# Patient Record
Sex: MALE | Race: ASIAN | NOT HISPANIC OR LATINO | URBAN - METROPOLITAN AREA
[De-identification: names, ages, dates, MRNs, and addresses within clinical notes are randomized per-mention and may not be internally consistent; named-entity substitution may affect disease eponyms.]

---

## 2017-01-10 ENCOUNTER — COMMUNICATION - HEALTHEAST (OUTPATIENT)
Dept: NURSING | Facility: CLINIC | Age: 22
End: 2017-01-10

## 2017-01-18 ENCOUNTER — COMMUNICATION - HEALTHEAST (OUTPATIENT)
Dept: NURSING | Facility: CLINIC | Age: 22
End: 2017-01-18

## 2017-02-06 ENCOUNTER — COMMUNICATION - HEALTHEAST (OUTPATIENT)
Dept: NURSING | Facility: CLINIC | Age: 22
End: 2017-02-06

## 2017-03-02 ENCOUNTER — COMMUNICATION - HEALTHEAST (OUTPATIENT)
Dept: NURSING | Facility: CLINIC | Age: 22
End: 2017-03-02

## 2017-03-03 ENCOUNTER — AMBULATORY - HEALTHEAST (OUTPATIENT)
Dept: CARE COORDINATION | Facility: CLINIC | Age: 22
End: 2017-03-03

## 2017-03-30 ENCOUNTER — COMMUNICATION - HEALTHEAST (OUTPATIENT)
Dept: NURSING | Facility: CLINIC | Age: 22
End: 2017-03-30

## 2017-04-28 ENCOUNTER — COMMUNICATION - HEALTHEAST (OUTPATIENT)
Dept: NURSING | Facility: CLINIC | Age: 22
End: 2017-04-28

## 2017-05-08 ENCOUNTER — COMMUNICATION - HEALTHEAST (OUTPATIENT)
Dept: NURSING | Facility: CLINIC | Age: 22
End: 2017-05-08

## 2017-05-17 ENCOUNTER — OFFICE VISIT - HEALTHEAST (OUTPATIENT)
Dept: FAMILY MEDICINE | Facility: CLINIC | Age: 22
End: 2017-05-17

## 2017-05-17 DIAGNOSIS — M25.561 ACUTE PAIN OF BOTH KNEES: ICD-10-CM

## 2017-05-17 DIAGNOSIS — G89.29 CHRONIC MIDLINE LOW BACK PAIN WITHOUT SCIATICA: ICD-10-CM

## 2017-05-17 DIAGNOSIS — M54.50 CHRONIC MIDLINE LOW BACK PAIN WITHOUT SCIATICA: ICD-10-CM

## 2017-05-17 DIAGNOSIS — G47.00 INSOMNIA, UNSPECIFIED: ICD-10-CM

## 2017-05-17 DIAGNOSIS — M25.562 ACUTE PAIN OF BOTH KNEES: ICD-10-CM

## 2017-05-30 ENCOUNTER — COMMUNICATION - HEALTHEAST (OUTPATIENT)
Dept: CARE COORDINATION | Facility: CLINIC | Age: 22
End: 2017-05-30

## 2017-06-15 ENCOUNTER — COMMUNICATION - HEALTHEAST (OUTPATIENT)
Dept: NURSING | Facility: CLINIC | Age: 22
End: 2017-06-15

## 2017-07-20 ENCOUNTER — COMMUNICATION - HEALTHEAST (OUTPATIENT)
Dept: FAMILY MEDICINE | Facility: CLINIC | Age: 22
End: 2017-07-20

## 2017-08-04 ENCOUNTER — COMMUNICATION - HEALTHEAST (OUTPATIENT)
Dept: NURSING | Facility: CLINIC | Age: 22
End: 2017-08-04

## 2017-08-07 ENCOUNTER — COMMUNICATION - HEALTHEAST (OUTPATIENT)
Dept: NURSING | Facility: CLINIC | Age: 22
End: 2017-08-07

## 2017-08-09 ENCOUNTER — COMMUNICATION - HEALTHEAST (OUTPATIENT)
Dept: NURSING | Facility: CLINIC | Age: 22
End: 2017-08-09

## 2017-08-16 ENCOUNTER — COMMUNICATION - HEALTHEAST (OUTPATIENT)
Dept: CARE COORDINATION | Facility: CLINIC | Age: 22
End: 2017-08-16

## 2017-08-16 ENCOUNTER — COMMUNICATION - HEALTHEAST (OUTPATIENT)
Dept: NURSING | Facility: CLINIC | Age: 22
End: 2017-08-16

## 2017-10-03 ENCOUNTER — COMMUNICATION - HEALTHEAST (OUTPATIENT)
Dept: NURSING | Facility: CLINIC | Age: 22
End: 2017-10-03

## 2017-10-18 ENCOUNTER — COMMUNICATION - HEALTHEAST (OUTPATIENT)
Dept: FAMILY MEDICINE | Facility: CLINIC | Age: 22
End: 2017-10-18

## 2017-12-26 ENCOUNTER — COMMUNICATION - HEALTHEAST (OUTPATIENT)
Dept: NURSING | Facility: CLINIC | Age: 22
End: 2017-12-26

## 2018-01-03 ENCOUNTER — OFFICE VISIT - HEALTHEAST (OUTPATIENT)
Dept: FAMILY MEDICINE | Facility: CLINIC | Age: 23
End: 2018-01-03

## 2018-01-03 DIAGNOSIS — M25.562 ACUTE PAIN OF BOTH KNEES: ICD-10-CM

## 2018-01-03 DIAGNOSIS — R63.0 DECREASED APPETITE: ICD-10-CM

## 2018-01-03 DIAGNOSIS — J06.9 ACUTE URI: ICD-10-CM

## 2018-01-03 DIAGNOSIS — Z23 NEED FOR INFLUENZA VACCINATION: ICD-10-CM

## 2018-01-03 DIAGNOSIS — Z88.9 MULTIPLE ALLERGIES: ICD-10-CM

## 2018-01-03 DIAGNOSIS — G47.00 INSOMNIA: ICD-10-CM

## 2018-01-03 DIAGNOSIS — M25.561 ACUTE PAIN OF BOTH KNEES: ICD-10-CM

## 2018-01-03 ASSESSMENT — MIFFLIN-ST. JEOR: SCORE: 1333.36

## 2018-02-12 ENCOUNTER — COMMUNICATION - HEALTHEAST (OUTPATIENT)
Dept: NURSING | Facility: CLINIC | Age: 23
End: 2018-02-12

## 2018-03-06 ENCOUNTER — COMMUNICATION - HEALTHEAST (OUTPATIENT)
Dept: NURSING | Facility: CLINIC | Age: 23
End: 2018-03-06

## 2018-03-21 ENCOUNTER — COMMUNICATION - HEALTHEAST (OUTPATIENT)
Dept: NURSING | Facility: CLINIC | Age: 23
End: 2018-03-21

## 2018-03-26 ENCOUNTER — COMMUNICATION - HEALTHEAST (OUTPATIENT)
Dept: NURSING | Facility: CLINIC | Age: 23
End: 2018-03-26

## 2018-04-25 ENCOUNTER — COMMUNICATION - HEALTHEAST (OUTPATIENT)
Dept: NURSING | Facility: CLINIC | Age: 23
End: 2018-04-25

## 2018-05-07 ENCOUNTER — OFFICE VISIT - HEALTHEAST (OUTPATIENT)
Dept: FAMILY MEDICINE | Facility: CLINIC | Age: 23
End: 2018-05-07

## 2018-05-07 DIAGNOSIS — H91.90 HEARING LOSS: ICD-10-CM

## 2018-05-07 DIAGNOSIS — F73 PROFOUND INTELLECTUAL DISABILITIES: ICD-10-CM

## 2018-05-07 DIAGNOSIS — Z00.00 WELL ADULT EXAM: ICD-10-CM

## 2018-05-07 DIAGNOSIS — R76.11 NONSPECIFIC REACTION TO TUBERCULIN SKIN TEST WITHOUT ACTIVE TUBERCULOSIS: ICD-10-CM

## 2018-05-07 DIAGNOSIS — F84.9 PERVASIVE DEVELOPMENTAL DISORDER: ICD-10-CM

## 2018-05-07 DIAGNOSIS — Z22.7 LATENT TUBERCULOSIS INFECTION: ICD-10-CM

## 2018-05-07 LAB
ALBUMIN SERPL-MCNC: 3.8 G/DL (ref 3.5–5)
ALP SERPL-CCNC: 69 U/L (ref 45–120)
ALT SERPL W P-5'-P-CCNC: 20 U/L (ref 0–45)
ANION GAP SERPL CALCULATED.3IONS-SCNC: 11 MMOL/L (ref 5–18)
AST SERPL W P-5'-P-CCNC: 35 U/L (ref 0–40)
BILIRUB SERPL-MCNC: 0.3 MG/DL (ref 0–1)
BUN SERPL-MCNC: 13 MG/DL (ref 8–22)
CALCIUM SERPL-MCNC: 9.4 MG/DL (ref 8.5–10.5)
CHLORIDE BLD-SCNC: 102 MMOL/L (ref 98–107)
CO2 SERPL-SCNC: 25 MMOL/L (ref 22–31)
CREAT SERPL-MCNC: 0.99 MG/DL (ref 0.7–1.3)
ERYTHROCYTE [DISTWIDTH] IN BLOOD BY AUTOMATED COUNT: 11.7 % (ref 11–14.5)
GFR SERPL CREATININE-BSD FRML MDRD: >60 ML/MIN/1.73M2
GLUCOSE BLD-MCNC: 73 MG/DL (ref 70–125)
HCT VFR BLD AUTO: 41.3 % (ref 40–54)
HGB BLD-MCNC: 13.4 G/DL (ref 14–18)
MCH RBC QN AUTO: 30.6 PG (ref 27–34)
MCHC RBC AUTO-ENTMCNC: 32.5 G/DL (ref 32–36)
MCV RBC AUTO: 94 FL (ref 80–100)
PLATELET # BLD AUTO: 265 THOU/UL (ref 140–440)
PMV BLD AUTO: 8.6 FL (ref 7–10)
POTASSIUM BLD-SCNC: 4.2 MMOL/L (ref 3.5–5)
PROT SERPL-MCNC: 7.2 G/DL (ref 6–8)
RBC # BLD AUTO: 4.38 MILL/UL (ref 4.4–6.2)
SODIUM SERPL-SCNC: 138 MMOL/L (ref 136–145)
TSH SERPL DL<=0.005 MIU/L-ACNC: 0.8 UIU/ML (ref 0.3–5)
WBC: 4.3 THOU/UL (ref 4–11)

## 2018-05-07 ASSESSMENT — MIFFLIN-ST. JEOR: SCORE: 1328.83

## 2018-05-14 ENCOUNTER — COMMUNICATION - HEALTHEAST (OUTPATIENT)
Dept: FAMILY MEDICINE | Facility: CLINIC | Age: 23
End: 2018-05-14

## 2018-05-14 DIAGNOSIS — D64.9 ANEMIA: ICD-10-CM

## 2018-05-15 ENCOUNTER — AMBULATORY - HEALTHEAST (OUTPATIENT)
Dept: LAB | Facility: CLINIC | Age: 23
End: 2018-05-15

## 2018-05-15 ENCOUNTER — COMMUNICATION - HEALTHEAST (OUTPATIENT)
Dept: TELEHEALTH | Facility: CLINIC | Age: 23
End: 2018-05-15

## 2018-05-15 DIAGNOSIS — D64.9 ANEMIA: ICD-10-CM

## 2018-05-15 LAB
FERRITIN SERPL-MCNC: 84 NG/ML (ref 27–300)
IRON SATN MFR SERPL: 31 % (ref 20–50)
IRON SERPL-MCNC: 125 UG/DL (ref 42–175)
TIBC SERPL-MCNC: 408 UG/DL (ref 313–563)
TRANSFERRIN SERPL-MCNC: 326 MG/DL (ref 212–360)

## 2018-05-17 LAB
HEMOGLOBIN A2 QUANTITATION: 3.2 % (ref 2.2–3.5)
HEMOGLOBIN ELECTROPHRESIS: NORMAL
HEMOGLOBIN F QUANTITATION: <0.8 % (ref 0–2)
PATH ICD:: NORMAL
REVIEWING PATHOLOGIST: NORMAL

## 2018-05-22 ENCOUNTER — OFFICE VISIT - HEALTHEAST (OUTPATIENT)
Dept: AUDIOLOGY | Facility: CLINIC | Age: 23
End: 2018-05-22

## 2018-05-22 DIAGNOSIS — H90.A21 SENSORINEURAL HEARING LOSS (SNHL) OF RIGHT EAR WITH RESTRICTED HEARING OF LEFT EAR: ICD-10-CM

## 2018-06-13 ENCOUNTER — COMMUNICATION - HEALTHEAST (OUTPATIENT)
Dept: NURSING | Facility: CLINIC | Age: 23
End: 2018-06-13

## 2018-06-14 ENCOUNTER — COMMUNICATION - HEALTHEAST (OUTPATIENT)
Dept: FAMILY MEDICINE | Facility: CLINIC | Age: 23
End: 2018-06-14

## 2018-06-14 DIAGNOSIS — M25.562 ACUTE PAIN OF BOTH KNEES: ICD-10-CM

## 2018-06-14 DIAGNOSIS — M25.561 ACUTE PAIN OF BOTH KNEES: ICD-10-CM

## 2018-06-20 ENCOUNTER — COMMUNICATION - HEALTHEAST (OUTPATIENT)
Dept: NURSING | Facility: CLINIC | Age: 23
End: 2018-06-20

## 2018-07-03 ENCOUNTER — COMMUNICATION - HEALTHEAST (OUTPATIENT)
Dept: NURSING | Facility: CLINIC | Age: 23
End: 2018-07-03

## 2018-08-03 ENCOUNTER — COMMUNICATION - HEALTHEAST (OUTPATIENT)
Dept: NURSING | Facility: CLINIC | Age: 23
End: 2018-08-03

## 2018-09-20 ENCOUNTER — COMMUNICATION - HEALTHEAST (OUTPATIENT)
Dept: NURSING | Facility: CLINIC | Age: 23
End: 2018-09-20

## 2018-10-09 ENCOUNTER — COMMUNICATION - HEALTHEAST (OUTPATIENT)
Dept: NURSING | Facility: CLINIC | Age: 23
End: 2018-10-09

## 2018-10-12 ENCOUNTER — AMBULATORY - HEALTHEAST (OUTPATIENT)
Dept: CARE COORDINATION | Facility: CLINIC | Age: 23
End: 2018-10-12

## 2018-10-25 ENCOUNTER — COMMUNICATION - HEALTHEAST (OUTPATIENT)
Dept: NURSING | Facility: CLINIC | Age: 23
End: 2018-10-25

## 2018-11-01 ENCOUNTER — COMMUNICATION - HEALTHEAST (OUTPATIENT)
Dept: FAMILY MEDICINE | Facility: CLINIC | Age: 23
End: 2018-11-01

## 2018-11-01 DIAGNOSIS — M25.562 ACUTE PAIN OF BOTH KNEES: ICD-10-CM

## 2018-11-01 DIAGNOSIS — M25.561 ACUTE PAIN OF BOTH KNEES: ICD-10-CM

## 2019-01-03 ENCOUNTER — HOSPITAL ENCOUNTER (OUTPATIENT)
Dept: LAB | Age: 24
Setting detail: SPECIMEN
Discharge: HOME | End: 2019-01-03

## 2019-01-03 ENCOUNTER — OFFICE VISIT - HEALTHEAST (OUTPATIENT)
Dept: FAMILY MEDICINE | Facility: CLINIC | Age: 24
End: 2019-01-03

## 2019-01-03 DIAGNOSIS — R04.0 RECURRENT EPISTAXIS: ICD-10-CM

## 2019-01-03 DIAGNOSIS — M25.561 ACUTE PAIN OF BOTH KNEES: ICD-10-CM

## 2019-01-03 DIAGNOSIS — M25.562 ACUTE PAIN OF BOTH KNEES: ICD-10-CM

## 2019-01-03 DIAGNOSIS — R63.0 DECREASED APPETITE: ICD-10-CM

## 2019-01-03 DIAGNOSIS — G47.00 INSOMNIA: ICD-10-CM

## 2019-01-03 LAB
ANION GAP SERPL CALCULATED.3IONS-SCNC: 12 MMOL/L (ref 5–18)
APTT PPP: 34 SECONDS (ref 24–37)
BUN SERPL-MCNC: 9 MG/DL (ref 8–22)
CALCIUM SERPL-MCNC: 9.9 MG/DL (ref 8.5–10.5)
CHLORIDE BLD-SCNC: 100 MMOL/L (ref 98–107)
CO2 SERPL-SCNC: 26 MMOL/L (ref 22–31)
CREAT SERPL-MCNC: 0.88 MG/DL (ref 0.7–1.3)
ERYTHROCYTE [DISTWIDTH] IN BLOOD BY AUTOMATED COUNT: 12.6 % (ref 11–14.5)
GFR SERPL CREATININE-BSD FRML MDRD: >60 ML/MIN/1.73M2
GLUCOSE BLD-MCNC: 77 MG/DL (ref 70–125)
HCT VFR BLD AUTO: 44.4 % (ref 40–54)
HGB BLD-MCNC: 14.4 G/DL (ref 14–18)
INR PPP: 0.96 (ref 0.9–1.1)
MCH RBC QN AUTO: 29.6 PG (ref 27–34)
MCHC RBC AUTO-ENTMCNC: 32.5 G/DL (ref 32–36)
MCV RBC AUTO: 91 FL (ref 80–100)
PLATELET # BLD AUTO: 291 THOU/UL (ref 140–440)
PMV BLD AUTO: 8.6 FL (ref 7–10)
POTASSIUM BLD-SCNC: 4.6 MMOL/L (ref 3.5–5)
RBC # BLD AUTO: 4.88 MILL/UL (ref 4.4–6.2)
SODIUM SERPL-SCNC: 138 MMOL/L (ref 136–145)
WBC: 3.5 THOU/UL (ref 4–11)

## 2019-01-04 ENCOUNTER — COMMUNICATION - HEALTHEAST (OUTPATIENT)
Dept: NURSING | Facility: CLINIC | Age: 24
End: 2019-01-04

## 2019-01-04 ENCOUNTER — COMMUNICATION - HEALTHEAST (OUTPATIENT)
Dept: CARE COORDINATION | Facility: CLINIC | Age: 24
End: 2019-01-04

## 2019-01-28 ENCOUNTER — COMMUNICATION - HEALTHEAST (OUTPATIENT)
Dept: NURSING | Facility: CLINIC | Age: 24
End: 2019-01-28

## 2019-03-18 ENCOUNTER — COMMUNICATION - HEALTHEAST (OUTPATIENT)
Dept: NURSING | Facility: CLINIC | Age: 24
End: 2019-03-18

## 2019-03-25 ENCOUNTER — OFFICE VISIT - HEALTHEAST (OUTPATIENT)
Dept: FAMILY MEDICINE | Facility: CLINIC | Age: 24
End: 2019-03-25

## 2019-03-25 DIAGNOSIS — R63.0 DECREASED APPETITE: ICD-10-CM

## 2019-03-25 DIAGNOSIS — G47.00 INSOMNIA: ICD-10-CM

## 2019-03-25 ASSESSMENT — MIFFLIN-ST. JEOR: SCORE: 1298.21

## 2019-04-19 ENCOUNTER — AMBULATORY - HEALTHEAST (OUTPATIENT)
Dept: NURSING | Facility: CLINIC | Age: 24
End: 2019-04-19

## 2019-05-20 ENCOUNTER — COMMUNICATION - HEALTHEAST (OUTPATIENT)
Dept: NURSING | Facility: CLINIC | Age: 24
End: 2019-05-20

## 2019-05-30 ENCOUNTER — COMMUNICATION - HEALTHEAST (OUTPATIENT)
Dept: NURSING | Facility: CLINIC | Age: 24
End: 2019-05-30

## 2019-06-05 ENCOUNTER — COMMUNICATION - HEALTHEAST (OUTPATIENT)
Dept: NURSING | Facility: CLINIC | Age: 24
End: 2019-06-05

## 2019-07-11 ENCOUNTER — COMMUNICATION - HEALTHEAST (OUTPATIENT)
Dept: NURSING | Facility: CLINIC | Age: 24
End: 2019-07-11

## 2019-07-16 ENCOUNTER — COMMUNICATION - HEALTHEAST (OUTPATIENT)
Dept: NURSING | Facility: CLINIC | Age: 24
End: 2019-07-16

## 2019-08-16 ENCOUNTER — COMMUNICATION - HEALTHEAST (OUTPATIENT)
Dept: NURSING | Facility: CLINIC | Age: 24
End: 2019-08-16

## 2019-09-06 ENCOUNTER — COMMUNICATION - HEALTHEAST (OUTPATIENT)
Dept: NURSING | Facility: CLINIC | Age: 24
End: 2019-09-06

## 2019-09-06 ENCOUNTER — AMBULATORY - HEALTHEAST (OUTPATIENT)
Dept: NURSING | Facility: CLINIC | Age: 24
End: 2019-09-06

## 2019-09-06 ENCOUNTER — COMMUNICATION - HEALTHEAST (OUTPATIENT)
Dept: TELEHEALTH | Facility: CLINIC | Age: 24
End: 2019-09-06

## 2019-09-06 ASSESSMENT — ACTIVITIES OF DAILY LIVING (ADL)
DEPENDENT_IADLS:: CLEANING;COOKING;LAUNDRY;SHOPPING;MEAL PREPARATION;MEDICATION MANAGEMENT;MONEY MANAGEMENT;TRANSPORTATION

## 2019-09-09 ENCOUNTER — COMMUNICATION - HEALTHEAST (OUTPATIENT)
Dept: FAMILY MEDICINE | Facility: CLINIC | Age: 24
End: 2019-09-09

## 2019-09-09 DIAGNOSIS — G47.00 INSOMNIA: ICD-10-CM

## 2019-09-09 DIAGNOSIS — R63.0 DECREASED APPETITE: ICD-10-CM

## 2019-10-07 ENCOUNTER — COMMUNICATION - HEALTHEAST (OUTPATIENT)
Dept: NURSING | Facility: CLINIC | Age: 24
End: 2019-10-07

## 2019-10-17 ENCOUNTER — COMMUNICATION - HEALTHEAST (OUTPATIENT)
Dept: NURSING | Facility: CLINIC | Age: 24
End: 2019-10-17

## 2019-11-20 ENCOUNTER — COMMUNICATION - HEALTHEAST (OUTPATIENT)
Dept: NURSING | Facility: CLINIC | Age: 24
End: 2019-11-20

## 2019-12-10 ENCOUNTER — COMMUNICATION - HEALTHEAST (OUTPATIENT)
Dept: FAMILY MEDICINE | Facility: CLINIC | Age: 24
End: 2019-12-10

## 2020-04-21 ENCOUNTER — COMMUNICATION - HEALTHEAST (OUTPATIENT)
Dept: NURSING | Facility: CLINIC | Age: 25
End: 2020-04-21

## 2020-05-01 ENCOUNTER — OFFICE VISIT - HEALTHEAST (OUTPATIENT)
Dept: FAMILY MEDICINE | Facility: CLINIC | Age: 25
End: 2020-05-01

## 2020-05-01 DIAGNOSIS — M25.561 ACUTE PAIN OF BOTH KNEES: ICD-10-CM

## 2020-05-01 DIAGNOSIS — M25.562 ACUTE PAIN OF BOTH KNEES: ICD-10-CM

## 2020-05-01 DIAGNOSIS — R63.0 DECREASED APPETITE: ICD-10-CM

## 2020-05-01 DIAGNOSIS — G47.00 INSOMNIA: ICD-10-CM

## 2020-05-01 RX ORDER — IBUPROFEN 200 MG
200-400 TABLET ORAL EVERY 6 HOURS PRN
Qty: 100 TABLET | Refills: 5 | Status: SHIPPED | OUTPATIENT
Start: 2020-05-01 | End: 2021-09-27

## 2020-05-01 RX ORDER — CYPROHEPTADINE HYDROCHLORIDE 4 MG/1
4 TABLET ORAL DAILY
Qty: 90 TABLET | Refills: 2 | Status: SHIPPED | OUTPATIENT
Start: 2020-05-01 | End: 2021-09-27

## 2020-05-01 RX ORDER — MIRTAZAPINE 30 MG/1
30 TABLET, FILM COATED ORAL AT BEDTIME
Qty: 90 TABLET | Refills: 2 | Status: SHIPPED | OUTPATIENT
Start: 2020-05-01 | End: 2022-05-26

## 2020-05-01 ASSESSMENT — PATIENT HEALTH QUESTIONNAIRE - PHQ9: SUM OF ALL RESPONSES TO PHQ QUESTIONS 1-9: 0

## 2020-11-06 ENCOUNTER — AMBULATORY - HEALTHEAST (OUTPATIENT)
Dept: CARE COORDINATION | Facility: CLINIC | Age: 25
End: 2020-11-06

## 2020-11-06 ENCOUNTER — COMMUNICATION - HEALTHEAST (OUTPATIENT)
Dept: NURSING | Facility: CLINIC | Age: 25
End: 2020-11-06

## 2020-11-06 DIAGNOSIS — Z65.9 PSYCHOSOCIAL PROBLEM: ICD-10-CM

## 2020-11-20 ENCOUNTER — COMMUNICATION - HEALTHEAST (OUTPATIENT)
Dept: CARE COORDINATION | Facility: CLINIC | Age: 25
End: 2020-11-20

## 2020-11-25 ENCOUNTER — COMMUNICATION - HEALTHEAST (OUTPATIENT)
Dept: NURSING | Facility: CLINIC | Age: 25
End: 2020-11-25

## 2020-12-02 ENCOUNTER — COMMUNICATION - HEALTHEAST (OUTPATIENT)
Dept: NURSING | Facility: CLINIC | Age: 25
End: 2020-12-02

## 2020-12-02 ASSESSMENT — ACTIVITIES OF DAILY LIVING (ADL): DEPENDENT_IADLS:: CLEANING;COOKING;LAUNDRY;SHOPPING;MEAL PREPARATION;TRANSPORTATION

## 2020-12-22 ENCOUNTER — COMMUNICATION - HEALTHEAST (OUTPATIENT)
Dept: CARE COORDINATION | Facility: CLINIC | Age: 25
End: 2020-12-22

## 2020-12-30 ENCOUNTER — AMBULATORY - HEALTHEAST (OUTPATIENT)
Dept: NURSING | Facility: CLINIC | Age: 25
End: 2020-12-30

## 2020-12-30 ENCOUNTER — OFFICE VISIT - HEALTHEAST (OUTPATIENT)
Dept: FAMILY MEDICINE | Facility: CLINIC | Age: 25
End: 2020-12-30

## 2020-12-30 DIAGNOSIS — F84.9 PERVASIVE DEVELOPMENTAL DISORDER: ICD-10-CM

## 2020-12-30 DIAGNOSIS — F73 PROFOUND INTELLECTUAL DISABILITIES: ICD-10-CM

## 2020-12-30 ASSESSMENT — MIFFLIN-ST. JEOR: SCORE: 1415.58

## 2021-01-07 ENCOUNTER — COMMUNICATION - HEALTHEAST (OUTPATIENT)
Dept: NURSING | Facility: CLINIC | Age: 26
End: 2021-01-07

## 2021-01-15 ENCOUNTER — COMMUNICATION - HEALTHEAST (OUTPATIENT)
Dept: NURSING | Facility: CLINIC | Age: 26
End: 2021-01-15

## 2021-02-08 ENCOUNTER — AMBULATORY - HEALTHEAST (OUTPATIENT)
Dept: NURSING | Facility: CLINIC | Age: 26
End: 2021-02-08

## 2021-02-24 ENCOUNTER — OFFICE VISIT - HEALTHEAST (OUTPATIENT)
Dept: OTOLARYNGOLOGY | Facility: CLINIC | Age: 26
End: 2021-02-24

## 2021-02-24 ENCOUNTER — COMMUNICATION - HEALTHEAST (OUTPATIENT)
Dept: NURSING | Facility: CLINIC | Age: 26
End: 2021-02-24

## 2021-02-24 DIAGNOSIS — H60.8X3 CHRONIC ECZEMATOID OTITIS EXTERNA OF BOTH EARS: ICD-10-CM

## 2021-03-08 ENCOUNTER — COMMUNICATION - HEALTHEAST (OUTPATIENT)
Dept: CARE COORDINATION | Facility: CLINIC | Age: 26
End: 2021-03-08

## 2021-03-22 ENCOUNTER — COMMUNICATION - HEALTHEAST (OUTPATIENT)
Dept: CARE COORDINATION | Facility: CLINIC | Age: 26
End: 2021-03-22

## 2021-03-25 ENCOUNTER — COMMUNICATION - HEALTHEAST (OUTPATIENT)
Dept: NURSING | Facility: CLINIC | Age: 26
End: 2021-03-25

## 2021-04-05 ENCOUNTER — COMMUNICATION - HEALTHEAST (OUTPATIENT)
Dept: NURSING | Facility: CLINIC | Age: 26
End: 2021-04-05

## 2021-04-07 ENCOUNTER — COMMUNICATION - HEALTHEAST (OUTPATIENT)
Dept: NURSING | Facility: CLINIC | Age: 26
End: 2021-04-07

## 2021-04-22 ENCOUNTER — COMMUNICATION - HEALTHEAST (OUTPATIENT)
Dept: CARE COORDINATION | Facility: CLINIC | Age: 26
End: 2021-04-22

## 2021-05-07 ENCOUNTER — COMMUNICATION - HEALTHEAST (OUTPATIENT)
Dept: NURSING | Facility: CLINIC | Age: 26
End: 2021-05-07

## 2021-05-27 ASSESSMENT — PATIENT HEALTH QUESTIONNAIRE - PHQ9: SUM OF ALL RESPONSES TO PHQ QUESTIONS 1-9: 0

## 2021-05-27 NOTE — PROGRESS NOTES
"Subjective:  23 y.o. male with concerns of follow-up and needing medication refills.  He has a developmental delay.  Family (brother and sister) have consistently reported that if he does not have a medicine that helps him sleep he is up wandering and frequently go outside and wandering the neighborhood.  Also, he does not eat very much.  Remeron and Cipro hepatoid Davide have been helpful in increasing diet.  He is not medications for about a month now and that causes difficulty with appetite.    Distantly, he has a letter today that says his Social Security income payments will be stopping as of March 2019.  This does not say why the stop which has occurred.  It was several reasons why it might.  He has a visit scheduled with a  here next month.    Outpatient Medications Prior to Visit   Medication Sig Dispense Refill     ibuprofen (MOTRIN IB) 200 MG tablet Take 1-2 tablets (200-400 mg total) by mouth every 6 (six) hours as needed for pain, fever or headaches. 100 tablet 5     sodium chloride (OCEAN) 0.65 % nasal spray 2 sprays into each nostril 2 (two) times a day. 15 mL 12     cyproheptadine (PERIACTIN) 4 mg tablet Take 1 tablet (4 mg total) by mouth daily. 90 tablet 1     mirtazapine (REMERON) 30 MG tablet Take 1 tablet (30 mg total) by mouth at bedtime. 90 tablet 1     No facility-administered medications prior to visit.       Social History     Tobacco Use   Smoking Status Never Smoker   Smokeless Tobacco Current User     Types: Chew      Objective:  BP 98/62 (Patient Site: Right Arm, Patient Position: Sitting, Cuff Size: Adult Small)   Pulse 98   Temp 99.4  F (37.4  C) (Oral)   Resp 20   Ht 5' 2.75\" (1.594 m)   Wt (!) 95 lb 4 oz (43.2 kg)   BMI 17.01 kg/m    GENERAL: alert, not distressed  CHEST: clear, no rales, rhonchi, or wheezes  CARDIAC: regular without murmur, gallop, or rub  ABDOMEN: soft, non tender, non distended, normal bowel sounds    Assessment and Plan:   1. Decreased " appetite  Was better on med, worse now that off.  No side effects noted.  Will continue  - cyproheptadine (PERIACTIN) 4 mg tablet; Take 1 tablet (4 mg total) by mouth daily.  Dispense: 90 tablet; Refill: 1    2. Insomnia  Better per brother with med.  No side effects.  Worse when off me.  Will renew  - mirtazapine (REMERON) 30 MG tablet; Take 1 tablet (30 mg total) by mouth at bedtime.  Dispense: 90 tablet; Refill: 1     Advised to keep follow-up appointment with .  Advised that he could go to Social Security office right now to address concerns in the letter.  Brother expresses that he intends to do this.

## 2021-05-28 NOTE — PROGRESS NOTES
Assessment    The pt, 2 family members, , ANUPAMA Agarwal and ANUPAMA Gutierrez present for meeting with pt to discuss citizenship. SW assisted with calling [facility] and completed intake, worker informed pt that pt s case will be passed on to the immigration law unit who will be following up with the pt within the next 4-6 weeks to discuss the pt s case further.    Pt s family reported his SSI had stopped and he hasn t received any SSI for a month now. Family showed SW the letter they received, however, letter was a notice saying SSI would stop after April, 2021 if pt does not obtain citizenship by then--no other letters at this time per family regarding SSI stopping now. Pt s family will try calling the SSA to learn more, will let CCC know if they struggle with this.    Pt has community support services via DD Waiver and family support. Pt needing minimal assist from CCC at this time.      Action Plan:    will:  1) Available as needed       Care Guide will:  Care Guide Delegation:   1)  Due Date:  None at this time       Delegation:      Subjective     The pt, 2 family members and  present for meeting with SW to discuss citizenship. SW assisted with completing intake with [facility] for assist with applying for citizenship--pt is eligible to apply beginning April, 2019.     SSI has been inactive for a month, only letter they had received from [facility] was a letter stating the pt will need to obtain citizenship by April, 2021 and if he does not, his SSI will end. SW advised pt and family to call the SSA or go into the local office to find out why benefits had been cut off at this time. SW informed the pt and family that if they struggle to reach the SSA to learn more, let CCC know.      Objective      Appearance: Casually and appropriately dressed, well-groomed, normal gait.      Behavior: Avoidant      Speech: Pt did not speak; family spoke on his behalf.      Emotion/Affect: Quiet      Thought Processes: not  assessed       Orientation: not assessed      Memory: not assessed       General Intellectual Abilities:  not assessed      Judgment: not assessed       Insight: not assessed     Clinical Recommendations: The pt has a good support system through family and community support services. Pt needing minimal assist at this time from CCC.    Goal updated:    Goals        Patient Stated      I would like to connect with a legal agency to help me apply for citizenship within the next 2 months. (pt-stated)      Action steps to achieve this goal  1.  I have completed intake with [facility] on 4/19/19 for assist with citizenship.  2. I will follow up with [facility] immigration law unit when they call me to discuss my case further and notify me if they will be taking my case.  3. I will update my care guide when I know if [facility] has established legal services to assist me.    Updated: 4/19/19  Date goal set:  1/28/19

## 2021-05-29 NOTE — PROGRESS NOTES
: Massiel ID#: [id number] Agency: [employer]    Scheduled Follow Up Call: Attempt 1  Care Guide called and left a message for the patient.  If the patient is returning my call, please transfer them to me, Hamilton Parks, at [phone].    Upcoming Appointments:  None     Next Outreach: 5/29/19  _____________________________  Planned Outreach Frequency: every 6 weeks  Preferred Phone Number: [phone]  Main /Legal Guardian: Axel Pop (brother)    Legal Immigration:  Born: [facility]  Date Came to [place]: [birth year]  Green Card:  Resident: 4/29/14  Expires: 3/17/26  Will need to begin citizenship process on or before 4/29/19  Dr. Dent will complete the N-648 form     Social Support:  Legal Guardian/Brother: Axel Gayathri Torrez  Sister: Eddie Alvarado

## 2021-05-29 NOTE — PROGRESS NOTES
: [facility] ID#: [id number] Agency: [employer]    Scheduled Follow Up Call Attempt 3:   Care Guide called patient.  If this patient is returning my call, please transfer to Hamilton Parks at [phone].  I have called this patient 3 times over the past two weeks and have been unsuccessful in reaching this patient.  This patient has also not returned any of my messages.  I will continue attempting to reach out to this patient in one month.  I will also check this patient's chart for upcoming appointments, ER reports that may contain a new phone number, or any other recent activity.    Email from Ruth Luz ([facility]):  Matt Villasenor,    I understand you are helping my client Eliseo Lfoton, 1995     I spoke with the client's brother and the brother said he would get me documents proving that he is the legal guardian (not just power of ).  Further, he said he would get me the front and back of his brother's green card.   Last, I did send some screening documents and questionnaire for client to fill it.  If you can, please check in with the client/brother to make sure these tasks are fulfilled.    Also, do you know if the client is getting a medical waiver?    Upcoming Appointments:  None     Next Outreach: 7/10/19  _____________________________  Planned Outreach Frequency: every 6 weeks  Preferred Phone Number: [phone]  Main /Legal Guardian: Axel oPp (brother)    Legal Immigration:  Born: [facility]  Date Came to [place]: [birth year]/14  Green Card:  Resident: 4/29/14  Expires: 3/17/26  Will need to begin citizenship process on or before 4/29/19  Dr. Dent will complete the N-648 form     Social Support:  Legal Guardian/Brother: Axel Gayathri Castleaw  Sister: Eddie Ibanez Alvarado

## 2021-05-29 NOTE — PROGRESS NOTES
: Terrance ID#: [id number] Agency: [employer]    Scheduled Follow Up Call: Attempt 2  Care Guide called and left a message for the patient.  If the patient is returning my call, please transfer them to me, Hamilton Parks, at [phone].    Upcoming Appointments:  None     Next Outreach: 6/5/19  _____________________________  Planned Outreach Frequency: every 6 weeks  Preferred Phone Number: [phone]  Main /Legal Guardian: Axel Pop (brother)    Legal Immigration:  Born: [facility]  Date Came to [place]: [birth year]14  Green Card:  Resident: 4/29/14  Expires: 3/17/26  Will need to begin citizenship process on or before 4/29/19  Dr. Dent will complete the N-648 form     Social Support:  Legal Guardian/Brother: Axel Gayathri Torrez  Sister: Eddie Alvarado

## 2021-05-30 NOTE — PROGRESS NOTES
: Roscoe ID #: [id number] Agency: [employer]    Scheduled Follow Up Call:   Attempt 2 Community Health Worker called and left a message for the patient. If the patient is returning my call, please transfer the patient to [facility] at ext. [phone].   I have called the patient 2 times over the past two weeks and have been unsuccessful in reaching him/her.  The patient has not returned any of my messages.                                                 I have mailed a letter to the patient requesting a return call and will continue attempting to reach out to the patient in one month. I will also check the patient's chart for upcoming appointments, ER reports that may contain a new phone number, or any other recent activity.    Plan:  Schedule RN Assessment    Upcoming Appointments:  None    Next Outreach: 8/16/19    Outreach Interval: Monthly

## 2021-05-30 NOTE — PROGRESS NOTES
: Terrance ID #: [id number] Agency: [employer]    Scheduled Follow Up Call:   Attempt 1 Community Health Worker called and left a message for the patient. If the patient is returning my call, please transfer the patient to Hamilton Parks at ext. [phone].    [employer] Upgrade    Open Encounter today.  Episodes created, care management status validated and encounters linked    Plan:  Schedule RN Assessment    Upcoming Appointments:  None    Next Outreach: 7/16/19    Outreach Interval: Monthly

## 2021-05-31 VITALS — BODY MASS INDEX: 18.25 KG/M2 | WEIGHT: 103 LBS | HEIGHT: 63 IN

## 2021-05-31 VITALS — BODY MASS INDEX: 17.28 KG/M2 | WEIGHT: 96 LBS

## 2021-05-31 NOTE — PROGRESS NOTES
: Mickie ID #: [id number] Agency: [employer]    The CHW called the patient's brother and was able to get an appointment scheduled with the SW for a Re-Assessment. The patient's brother agreed and the appointment was scheduled.    Upcoming Appointments:  9/6/19 at 9:00 with [facility] SW    Next Outreach: 9/6/19

## 2021-06-01 VITALS — WEIGHT: 102 LBS | HEIGHT: 63 IN | BODY MASS INDEX: 18.07 KG/M2

## 2021-06-01 NOTE — PROGRESS NOTES
Agency: [facility]    The CHW met with the patient while he was in the clinic to meet with the SW. The CHW was able to give the patient and the patient's brother the Welcome folder for [facility]3]. The CHW also had the patient's brother sign a new ANGEL for [facility].    Upcoming Appointments:  9/6/19 at 9:00 with [facility]3] SW    Next Outreach: 10/7/19

## 2021-06-01 NOTE — TELEPHONE ENCOUNTER
Refill Approved    Rx renewed per Medication Renewal Policy. Medication was last renewed on 3/25/19.    Breanna Cárdenas, Care Connection Triage/Med Refill 9/10/2019     Requested Prescriptions   Pending Prescriptions Disp Refills     mirtazapine (REMERON) 30 MG tablet [Pharmacy Med Name: MIRTAZAPINE 30 MG TABLET] 30 tablet 0     Sig: TAKE 1 TABLET BY MOUTH AT BEDTIME.       Tricyclics/Misc Antidepressant/Antianxiety Meds Refill Protocol Passed - 9/9/2019  2:00 PM        Passed - PCP or prescribing provider visit in last year     Last office visit with prescriber/PCP: 3/25/2019 Los Dent MD OR same dept: 3/25/2019 Los Dent MD OR same specialty: 3/25/2019 Los Dent MD  Last physical: 9/10/2015 Last MTM visit: Visit date not found   Next visit within 3 mo: Visit date not found  Next physical within 3 mo: Visit date not found  Prescriber OR PCP: Los Dent MD  Last diagnosis associated with med order: 1. Insomnia  - mirtazapine (REMERON) 30 MG tablet [Pharmacy Med Name: MIRTAZAPINE 30 MG TABLET]; TAKE 1 TABLET BY MOUTH AT BEDTIME.  Dispense: 30 tablet; Refill: 0    2. Decreased appetite  - cyproheptadine (PERIACTIN) 4 mg tablet [Pharmacy Med Name: CYPROHEPTADINE 4 MG TABLET]; TAKE ONE TABLET BY MOUTH DAILY  Dispense: 30 tablet; Refill: 0    If protocol passes may refill for 12 months if within 3 months of last provider visit (or a total of 15 months).             cyproheptadine (PERIACTIN) 4 mg tablet [Pharmacy Med Name: CYPROHEPTADINE 4 MG TABLET] 30 tablet 0     Sig: TAKE ONE TABLET BY MOUTH DAILY       Antihistamine Refill Protocol Passed - 9/9/2019  2:00 PM        Passed - Patient has had office visit/physical in last year     Last office visit with prescriber/PCP: 3/25/2019 Los Dent MD OR same dept: 3/25/2019 Los Dent MD OR same specialty: 3/25/2019 Los Dent MD  Last physical: 9/10/2015 Last MTM visit: Visit date not found   Next visit within 3 mo: Visit date not  found  Next physical within 3 mo: Visit date not found  Prescriber OR PCP: Los Dent MD  Last diagnosis associated with med order: 1. Insomnia  - mirtazapine (REMERON) 30 MG tablet [Pharmacy Med Name: MIRTAZAPINE 30 MG TABLET]; TAKE 1 TABLET BY MOUTH AT BEDTIME.  Dispense: 30 tablet; Refill: 0    2. Decreased appetite  - cyproheptadine (PERIACTIN) 4 mg tablet [Pharmacy Med Name: CYPROHEPTADINE 4 MG TABLET]; TAKE ONE TABLET BY MOUTH DAILY  Dispense: 30 tablet; Refill: 0    If protocol passes may refill for 12 months if within 3 months of last provider visit (or a total of 15 months).

## 2021-06-02 VITALS — BODY MASS INDEX: 17.14 KG/M2 | WEIGHT: 96 LBS

## 2021-06-02 VITALS — HEIGHT: 63 IN | BODY MASS INDEX: 16.88 KG/M2 | WEIGHT: 95.25 LBS

## 2021-06-02 NOTE — PROGRESS NOTES
: Massiel ID #: [id number] Agency: [employer]    Scheduled Follow Up Call:   Attempt 1 Community Health Worker called and left a message for the patient. If the patient is returning my call, please transfer the patient to Hamilton Parks at ext. [phone].    Email from Ruth from [facility]:  I had emailed the clients brother to provide me with court documents showing that he is the legal guardian (and not something else, like power of ).    Further, he said he would get me the front and back of his brother's green card.  I don t have the file in front of me so the brother will have to confirm whether this was sent.   Last, I did send some screening documents and questionnaire for client to fill it. If you can, please check in with the client/brother to make sure these documents were filled and sent back, that would be great.    Upcoming Appointments:  None    Next Outreach: 10/17/19

## 2021-06-03 NOTE — PROGRESS NOTES
: Scott ID #: [id number] Agency: [employer]    Scheduled Follow Up Call: Attempt 3 Community Health Worker called and left a message for the patient. If the patient is returning my call, please transfer the patient to [facility] at ext. [phone].   I have called the patient 3 times over the past six weeks, mailed an disenrollment letter today and have been unsuccessful in reaching him/her. Patient has been disenrolled from Clinic Care coordination services, should they return my call or answer my letter, I will discuss clinic care coordination re-enrollment.

## 2021-06-04 NOTE — TELEPHONE ENCOUNTER
Called. Left detail message on voicemail thru language line  that [facility] providers don't do refugee screening. Pt can have this done at [place] by Dr. Cantu.

## 2021-06-04 NOTE — TELEPHONE ENCOUNTER
New Appointment Needed  What is the reason for the visit:    Refugee Screening   Provider Preference: PCP only  How soon do you need to be seen?: asap   Waitlist offered?: No  Okay to leave a detailed message:  Yes

## 2021-06-05 VITALS
HEART RATE: 83 BPM | HEIGHT: 63 IN | BODY MASS INDEX: 21.62 KG/M2 | SYSTOLIC BLOOD PRESSURE: 105 MMHG | DIASTOLIC BLOOD PRESSURE: 70 MMHG | WEIGHT: 122 LBS

## 2021-06-07 ENCOUNTER — COMMUNICATION - HEALTHEAST (OUTPATIENT)
Dept: NURSING | Facility: CLINIC | Age: 26
End: 2021-06-07

## 2021-06-07 NOTE — PROGRESS NOTES
"Eliseo Lofton is a 24 y.o. male who is being evaluated via a billable telephone visit.      The patient has been notified of following:     \"This telephone visit will be conducted via a call between you and your physician/provider. We have found that certain health care needs can be provided without the need for a physical exam.  This service lets us provide the care you need with a short phone conversation.  If a prescription is necessary we can send it directly to your pharmacy.  If lab work is needed we can place an order for that and you can then stop by our lab to have the test done at a later time.    Telephone visits are billed at different rates depending on your insurance coverage. During this emergency period, for some insurers they may be billed the same as an in-person visit.  Please reach out to your insurance provider with any questions.    If during the course of the call the physician/provider feels a telephone visit is not appropriate, you will not be charged for this service.\"    Patient has given verbal consent to a Telephone visit? Yes    Patient would like to receive their AVS by AVS Preference: Mail a copy.    Additional provider notes:  24-year-old with history of pervasive developmental disorder, profound intellectual disabilities.  Brother does the talking for him through an .  Needs refills on medications.  Things have not changed much.    Brother reports that he takes mirtazapine 30 mg for sleep and this helps a great deal.  Does not seem to cause any side effects.  Being underweight is a side effect anyway.  Takes Periactin 4 mg daily for this which also helps with appetite.  Ibuprofen twice a day in general for back pain, knee pain, headaches.  No major problems.  Plans to get a physical exam when we are doing physical exams.  Discussed this with him.  Assessment/Plan:  \1. Decreased appetite  Renewal, brother reports this medication is effective, do not follow weights regularly.  " No major side effects  - cyproheptadine (PERIACTIN) 4 mg tablet; Take 1 tablet (4 mg total) by mouth daily.  Dispense: 90 tablet; Refill: 1    2. Acute pain of both knees  Pain plus occasional headaches.  Encourage use of ibuprofen only as needed  - ibuprofen (MOTRIN IB) 200 MG tablet; Take 1-2 tablets (200-400 mg total) by mouth every 6 (six) hours as needed for pain, fever or headaches.  Dispense: 100 tablet; Refill: 5    3. Insomnia  Effective, no major side effects  - mirtazapine (REMERON) 30 MG tablet; Take 1 tablet (30 mg total) by mouth at bedtime.  Dispense: 90 tablet; Refill: 1    Phone call duration: 12 minutes    Usman Dotson MD

## 2021-06-09 NOTE — PROGRESS NOTES
: Suraj Mooney Eddie   ID: [id number]  Company: [employer]  Spoke to: Eddie Alvarado (Sister)     Has a form from Social Security  It looks different then the forms they have seen in the past  Assisted in coordinating an appointment with BERE Lemons, for 3/2/17 at 9:30  Eddie Alvarado intends on accompanying patient to the appointment  Reminded her to bring the form with    Need a copy of the Legal Guardianship Court Order scanned in patient's chart    Realized Any Pierre, is not at the [facility] the morning of 3/2/17  Called Eddie Alvarado back with the assistance of a Janette  from the [employer]  Rescheduled the appointment for 3/3/17 at 1:00     Pending Appointments:  3/3/17 at 1:00 with BERE Lemons  _____________________________  Planned Outreach Frequency: monthly  Preferred Phone Number: [phone]  Main /Legal Guardian: Axel Pop (brother)    Preferred : Graciela Cheney  Phone: [phone]  Agency: [facility]    Chronic Medical Diagnosis:  Headache   Insomnia    Mental Health:  Diagnosis:   Pervasive Developmental Disorders  Profound Intellectual Disabilities  Autistic Disorder  PTSD  Severe Mental Retardation  Mental Health Provider: None    Transportation:  Family    Housing:  Renting 3 bedroom apartment  Rent: $950.00/month  Portion Responsible for Rent: $300.00/month    Financial:  SSI: $733/month (began 4/2014, expires 4/2021 if citizenship not obtained)  Social : BLANCA Herron  Phone: [phone] ext. [phone]  Rep-Payee: Eddie Alvarado (sister)    Legal Immigration:  Born: [facility]  Date Came to [place]: [birth year]  Green Card:  Resident: 4/29/14  Expires: 3/17/26  Will need to begin citizenship process on or before 4/29/19  Dr. Dent will complete the N-648 form     Substance/CD:  Smoking: Never Smoker   Smokeless Tobacco: Current User-[employer]   Alcohol: Not Asked     Employment/Education:  [employer]  Grade    Interpersonal:  Single    Social Support:  Legal Guardian/Brother: Axel Torrez  Sister: Eddie Alvarado    Household Members (11):  Self  Sister (12 yr old)  Cousin (67 yr old female)  Sister: Eddie Alvarado  Brother-in-Law (Eddie Alvarado's )  Neices/Nephews (Eddie Alvarado's children) 4 kids  Mother: Andria Mac (also in CCC with Kathy, Clinic Care Guide)  1 additional person    Rest/Sleep:  Lays down: 11-12PM  Gets up: 8-9AM    Nutrition:  Unknown    Exercise:  Unknown    Hygiene:  Dependent on PCA    Medications:  Medication Management: Legal Guardian/brother, Axel Torrez  Per Didi Tijerina RN on 11/9/16, patient no longer needs MEV appointments    Preventative Measures:  Medical Insurance: [employer]  ID: [id number]  Annual Physical Exam: 9/10/15    Cheondoism/Spiritual:  Rastafarian    Safety:  Gets up 1-2 times a night for the bathroom  Concerns with patient leaving the apartment while the family members are sleeping    Barriers:  Language: Non-English speaking, doesn't speak very much  Intellectual Disabilities    Current Services/Support:  PCA Agency: [facility]  Phone: [phone]  PCA: Axel Pop (brother)  PCA Hours: 5 1/2 per day  County Waiver: [facility] : Julián Tay  Phone: [phone]; [phone]  PT/OT: Russ Lee

## 2021-06-09 NOTE — PROGRESS NOTES
Assessment  The patient, his sister and an  were present for the meeting. The sister had a form from [facility] that needed to be completed regarding SSI rep payee annual report, SW assisted with completing this. The patient's sister also does not want to be the rep payee anymore, will need to go to local [facility] office with new rep payee to change this. The patient seems to have a good support system and now has a legal guardian; SW made copies of the paperwork for this per CG request and gave them to CG. No other needs at this time reported.       Action Plan:    will:  1) Assisted the patient's sister with completing Rep Payee annual form  2) Assisted the patient's sister with contacting [facility]      Care Guide will:  Care Guide Delegation: None at this time  1)  Due Date:         Delegation:        Subjective   The patient, the patient's sister and an  were present for the meeting. The patient's sister brought in paperwork from [facility] that was the Representative Payee Report--done annually to check-in on rep payee fulfilling duties appropriately. This was a short form but took a while to fill out since it has questions regarding specifics on what the patient's SSI benefits were spent on--split into housing and food, other items/activities and savings. The patient's sister is currently the rep payee and did not track this well, but was able to give an estimate based off of the total amount that was provided on the form for what was given to the patient for 2016. The patient's sister also reported that she wanted to not be the rep payee anymore and that she wanted her brother to do this. SW assisted the patient's sister with contacting [facility] for this, was on the phone for 49 minutes. [facility] informed SW that they would need to go to their local office for this and it can't be done over the phone, can do walk-in--local office is the [place] office (the sister and the brother who will become rep payee  will both need to go in). The patient's sister reported she understood and will do this.          Objective    Appearance: Casual dress, well-groomed, normal gait.      Behavior: Calm, quiet.      Speech:Non-English speaking; did not speak during meeting.      Emotion/Affect: Relaxed, did not interact with SW.      Thought Processes: Did not speak to SW during meeting.      Orientation: not assessed      Memory: not assessed.      General Intellectual Abilities: not assessed      Judgment: not assessed      Insight: not assessed      Clinical Recommendations: The patient did not speak during the meeting and did not interact with SW; the patient does have a good support system and has a legal guardian as well as a rep payee.

## 2021-06-09 NOTE — PROGRESS NOTES
Patient has an appointment on 3/3/17 at 1:00 with Any Pierre, CCC SW    Spoke to: Eddie Alvarado (sister)    1) Visit type: Paperwork/Forms   a. Does the paperwork or form have a due date?  If yes, what is the date? Unsure, Eddie Alvarado stated she doesn't know where to look to find this information   b. How many pages of paperwork are there?  1  **If forms or paperwork are five or more pages or multiple issues (2-3) need to be addressed, prepare the patient that it may take more than one visit with the SW to address all issues.**  c. What is the type of paperwork? RE: Disability, other Form for Social Security, Eddie Alvarado stated it looks different than what they have seen in the past      Are you working with any other Social workers or providers on this issue? No

## 2021-06-10 ENCOUNTER — COMMUNICATION - HEALTHEAST (OUTPATIENT)
Dept: CARE COORDINATION | Facility: CLINIC | Age: 26
End: 2021-06-10

## 2021-06-10 NOTE — PROGRESS NOTES
Subjective:  21 y.o. male with concerns of follow-up.  Brother is here.  Brother reports he is legally patient's guardian now.  Remeron has been taking for a few days.  Sleep is a problem.  Brother continues to be concerned about patient's appetite.  Requests an increase of the Remeron dose.  He inquires about getting nutritional supplements such as Ensure shakes for the patient.  The good news is his weight is increased by 4 pounds since last year.  He continues to complain of some pain.  Sometimes it is within the knees.  At other times is difficult to tell due to the patient's developmental status.    History   Smoking Status     Never Smoker   Smokeless Tobacco     Current User     Types: Chew      Objective:  BP 98/60 (Patient Site: Right Arm, Patient Position: Sitting, Cuff Size: Adult Regular)  Pulse 72  Temp 97.8  F (36.6  C) (Oral)   Wt (!) 96 lb (43.5 kg)  BMI 17.28 kg/m2  GENERAL: alert, not distressed  EARS: normal tympanic membranes and external auditory canals bilaterally  PHARYNX: no erythema or exudates  MOUTH: well hydrated mucosa, no lesions  NECK: no lymphadenopathy or thyroid nodules  CHEST: clear, no rales, rhonchi, or wheezes  CARDIAC: regular without murmur  ABDOMEN: soft, non tender, non distended, normal bowel sounds    Assessment and Plan:  1. Insomnia  We will increase dose of mirtazapine to 30 mg.  I do not think it would be significant improvement again from increasing it further.  Follow up with any problems.  In regard to nutritional status, he had no signs of malnutrition last year when his labs were checked.  With him gaining 4 pounds in the last year I doubt that there will be a significant change in this.  Discussed with the brother and he agreed to forego lab testing for that today.  I do not think he will benefit significantly from a nutritional shake preparation.  - mirtazapine (REMERON) 30 MG tablet; Take 1 tablet (30 mg total) by mouth at bedtime.  Dispense: 90 tablet;  Refill: 1    2. Acute pain of both knees  3. Chronic midline low back pain without sciatica  PRN use of NSAIDs  - ibuprofen (MOTRIN IB) 200 MG tablet; Take 1-2 tablets (200-400 mg total) by mouth every 6 (six) hours as needed for pain.  Dispense: 100 tablet; Refill: 0

## 2021-06-10 NOTE — PROGRESS NOTES
: Lilly  Company: [employer]  Spoke to: Axel Pop (Brother/Legal Guardian)    Axel Pop is the patient's court appointed Legal Guardian  The court order is in the patient's chart  Completed this goal    Has not received the Wander Alarm yet  Doesn't believe the patient has been assessed for a MH Targeted     He requested a follow up appointment with Dr. Dent  Patient is out of 1 of his medications; however, he didn't know the name  Stated it was one that he takes twice a day  Assisted in coordinating an appointment with Dr. Dent on 5/4/17 at 8:45    Pending Appointments:  5/4/17 at 8:45 with Dr. Dent  _____________________________  Planned Outreach Frequency: monthly  Preferred Phone Number: [phone]  Main /Legal Guardian: Axel Pop (brother)    Preferred : Graciela Cheney  Phone: [phone]  Agency: [facility]    Chronic Medical Diagnosis:  Headache   Insomnia    Mental Health:  Diagnosis:   Pervasive Developmental Disorders  Profound Intellectual Disabilities  Autistic Disorder  PTSD  Severe Mental Retardation  Mental Health Provider: None    Transportation:  Family    Housing:  Renting 3 bedroom apartment  Rent: $950.00/month  Portion Responsible for Rent: $300.00/month    Financial:  SSI: $733/month (began 4/2014, expires 4/2021 if citizenship not obtained)  Social : BLANCA Herron  Phone: [phone] ext. [phone]  Rep-Payee: Eddie Alvarado (sister)    Legal Immigration:  Born: [place]  Date Came to [place]: 4[place]29/14  Green Card:  Resident: 4/29/14  Expires: 3/17/26  Will need to begin citizenship process on or before 4/29/19  Dr. Dent will complete the N-648 form     Substance/CD:  Smoking: Never Smoker   Smokeless Tobacco: Current User-Betal Nuts   Alcohol: Not Asked     Employment/Education:  2nd Grade    Interpersonal:  Single    Social Support:  Legal Guardian/Brother: Axel Torrez  Sister: Eddie Alvarado    Household Members  (11):  Self  Sister (12 yr old)  Cousin (67 yr old female)  Sister: Eddie Alvarado  Brother-in-Law (Eddie Alvarado's )  Neices/Nephews (Eddie Alvarado's children) 4 kids  Mother: Andria Mac (also in CCC with Kathy, Clinic Care Guide)  1 additional person    Rest/Sleep:  Lays down: 11-12PM  Gets up: 8-9AM    Nutrition:  Unknown    Exercise:  Unknown    Hygiene:  Dependent on PCA    Medications:  Medication Management: Legal Guardian/brother, Axel Torrez  Per Didi Tijerina RN on 11/9/16, patient no longer needs MEV appointments    Preventative Measures:  Medical Insurance: [employer]  ID: [id number]  Annual Physical Exam: 9/10/15    Oriental orthodox/Spiritual:  Catholic    Safety:  Gets up 1-2 times a night for the bathroom  Concerns with patient leaving the apartment while the family members are sleeping    Barriers:  Language: Non-English speaking, doesn't speak very much  Intellectual Disabilities    Current Services/Support:  PCA Agency: [facility]  Phone: [phone]  PCA: Axel Pop (brother)  PCA Hours: 5 1/2 per day  [facility] Waiver: [facility] : Julián Tay  Phone:[phone]  Fax: [phone]  PT/OT: Russ Lee

## 2021-06-11 NOTE — PROGRESS NOTES
: Scott  ID: [id number]   Company: [employer]  Spoke to: Axel Pop (Brother/Legal Guardian)    Wander Alarm was installed  Axel Pop states he feels the patient is safe at home  Completed the goal    Axel Pop agreed to change outreach to every 6 weeks    Pending Appointments:  None  _____________________________  Planned Outreach Frequency: every 6 weeks  Preferred Phone Number: [phone]  Main /Legal Guardian: Axel Pop (brother)    Preferred : Graciela Cheney  Phone: [phone]  Agency: [facility]    Chronic Medical Diagnosis:  Headache   Insomnia    Mental Health:  Diagnosis:   Pervasive Developmental Disorders  Profound Intellectual Disabilities  Autistic Disorder  PTSD  Severe Mental Retardation  Mental Health Provider: None    Transportation:  Family    Housing:  Renting 3 bedroom apartment  Rent: $950.00/month  Portion Responsible for Rent: $300.00/month    Financial:  SSI: $733/month (began 4/2014, expires 4/2021 if citizenship not obtained)  Social : BLANCA Herron  Phone: [phone] ext. [phone]  Rep-Payee: Eddie Alvarado (sister)    Legal Immigration:  Born: [facility]  Date Came to [place]: 4/29/14  Green Card:  Resident: 4/29/14  Expires: 3/17/26  Will need to begin citizenship process on or before 4/29/19  Dr. Dent will complete the N-648 form     Substance/CD:  Smoking: Never Smoker   Smokeless Tobacco: Current User-Betal Nuts   Alcohol: Not Asked     Employment/Education:  2nd Grade    Interpersonal:  Single    Social Support:  Legal Guardian/Brother: Axel Torrez  Sister: Eddie Alvarado    Household Members (11):  Self  Sister (12 yr old)  Cousin (67 yr old female)  Sister: Eddie Alvarado  Brother-in-Law (Eddie Alvarado's )  Neices/Nephews (Eddie Alvarado's children) 4 kids  Mother: Andria Mac (also in CCC with Kathy, Clinic Care Guide)  1 additional person    Rest/Sleep:  Lays down: 11-12PM  Gets up:  8-9AM    Nutrition:  Unknown    Exercise:  Unknown    Hygiene:  Dependent on PCA    Medications:  Medication Management: Legal Guardian/brother, Axel Torrez  Per Didi Tijerina RN on 11/9/16, patient no longer needs MEV appointments    Preventative Measures:  Medical Insurance: [employer]  ID: [id number]  Annual Physical Exam: 9/10/15    Pentecostalism/Spiritual:  Hindu    Safety:  Gets up 1-2 times a night for the bathroom  Concerns with patient leaving the apartment while the family members are sleeping  Wander Alarm installed 5/23/17    Barriers:  Language: Non-English speaking, doesn't speak very much  Intellectual Disabilities    Current Services/Support:  PCA Agency: [facility]  Phone: [phone]  PCA: Axel Pop (brother)  PCA Hours: 5 1/2 per day  County Waiver: [facility] : Julián Tay  Phone:[phone]  Fax: [phone]  PT/OT: Russ Lee

## 2021-06-12 NOTE — PROGRESS NOTES
: Eulogio  ID: [id number]  Company: [employer]  Talked to: Axel Pop (brother)    Staff message sent to ANUPAMA Lemons, requesting her assistance with patient's goal  Per Axel, he states caring for the patient is similar to caring for a child.  He has to watch him 24hrs/day, 7 days/week  Patient does not have any additional services beyond the PCA of 5.5 hrs/day  Would really like an adult day care or group therapy he could go to during the week    Pending Appointments:  None  _____________________________  Planned Outreach Frequency: every 6 weeks  Preferred Phone Number: [phone]  Main /Legal Guardian: Axel Pop (brother)    Preferred : Graciela Cheney  Phone: [phone]  Agency: [facility]    Chronic Medical Diagnosis:  Headache   Insomnia    Mental Health:  Diagnosis:   Pervasive Developmental Disorders  Profound Intellectual Disabilities  Autistic Disorder  PTSD  Severe Mental Retardation  Mental Health Provider: None    Transportation:  Family    Housing:  Renting 3 bedroom apartment  Rent: $950.00/month  Portion Responsible for Rent: $300.00/month    Financial:  SSI: $733/month (began 4/2014, expires 4/2021 if citizenship not obtained)  Social : BLANCA Herron  Phone: [phone] ext. [phone]  Rep-Payee: Eddie Alvarado (sister)    Legal Immigration:  [facility]  Date Came to [place]: 4/29/14  Green Card:  Resident: 4/29/14  Expires: 3/17/26  Will need to begin citizenship process on or before 4/29/19  Dr. Dent will complete the N-648 form     Substance/CD:  Smoking: Never Smoker   Smokeless Tobacco: Current User-Betal Nuts   Alcohol: Not Asked     Employment/Education:  2nd Grade    Interpersonal:  Single    Social Support:  Legal Guardian/Brother: Axel Torrez  Sister: Eddie Alvarado    Household Members (11):  Self  Sister (12 yr old)  Cousin (67 yr old female)  Sister: Eddie Alvarado  Brother-in-Law (Eddie Alvarado's )  Neices/Nephews (Eddie Alvarado's  children) 4 kids  Mother: Andria Mac (also in CCC with Kathy, Clinic Care Guide)  1 additional person    Rest/Sleep:  Lays down: 11-12PM  Gets up: 8-9AM    Nutrition:  Unknown    Exercise:  Unknown    Hygiene:  Dependent on PCA    Medications:  Medication Management: Legal Guardian/brother, Axel Torrez  Per Didi Tijerina RN on 11/9/16, patient no longer needs MEV appointments    Preventative Measures:  Medical Insurance: [employer]  ID: [id number]  Annual Physical Exam: 9/10/15    Latter-day/Spiritual:  Bahai    Safety:  Gets up 1-2 times a night for the bathroom  Concerns with patient leaving the apartment while the family members are sleeping  Wander Alarm installed 5/23/17    Barriers:  Language: Non-English speaking, doesn't speak very much  Intellectual Disabilities    Current Services/Support:  PCA Agency: [facility]  Phone: [phone]  PCA: Axel Castlea (brother)  PCA Hours: 5 1/2 per day  County Waiver: [facility] Juli[facility]  Phone:[phone]  Fax: [phone]  PT/OT: Russ Lee

## 2021-06-12 NOTE — PROGRESS NOTES
Clinic Care Coordination Contact  [facility]/Voicemail    : Eddie ID #: [id number] Agency: [employer]    Clinical Data: Care Coordinator Outreach  Outreach attempted x 2.  Left message on patient's voicemail with call back information and requested return call.  Plan: Care Coordinator will try to reach patient again in 3-5 business days.    Next Outreach: 11/16/20

## 2021-06-12 NOTE — PROGRESS NOTES
: Eulogio  ID: [id number]   Company: [employer]  Scheduled Follow Up Call: Attempt 1  Care Guide called and left a message for Axel Pop (Brother/Legal Guardian).  If he is returning my call, please transfer him  to me, Kathy Martinez, at [phone].    Pending Appointments:  None  _____________________________  Planned Outreach Frequency: every 6 weeks  Preferred Phone Number: [phone]  Main /Legal Guardian: Axel Pop (brother)    Preferred : Graciela Cheney  Phone: [phone]  Agency: [facility]    Chronic Medical Diagnosis:  Headache   Insomnia    Mental Health:  Diagnosis:   Pervasive Developmental Disorders  Profound Intellectual Disabilities  Autistic Disorder  PTSD  Severe Mental Retardation  Mental Health Provider: None    Transportation:  Family    Housing:  Renting 3 bedroom apartment  Rent: $950.00/month  Portion Responsible for Rent: $300.00/month    Financial:  SSI: $733/month (began 4/2014, expires 4/2021 if citizenship not obtained)  Social : BLANCA Herron  Phone: [phone] ext. [phone]  Rep-Payee: Eddie Alvarado (sister)    Legal Immigration:  Born: [facility]  Date Came to [place]: 4/29/14  Green Card:  Resident: 4/29/14  Expires: 3/17/26  Will need to begin citizenship process on or before 4/29/19  Dr. Dent will complete the N-648 form     Substance/CD:  Smoking: Never Smoker   Smokeless Tobacco: Current User-Betal Nuts   Alcohol: Not Asked     Employment/Education:  2nd Grade    Interpersonal:  Single    Social Support:  Legal Guardian/Brother: Axel Torrez  Sister: Eddie Alvarado    Household Members (11):  Self  Sister (12 yr old)  Cousin (67 yr old female)  Sister: Eddie Alvarado  Brother-in-Law (Eddie Alvarado's )  Neices/Nephews (Eddie Alvarado's children) 4 kids  Mother: Andria Mac (also in CCC with Kathy, Clinic Care Guide)  1 additional person    Rest/Sleep:  Lays down: 11-12PM  Gets up:  8-9AM    Nutrition:  Unknown    Exercise:  Unknown    Hygiene:  Dependent on PCA    Medications:  Medication Management: Legal Guardian/brother, Axel Torrez  Per Didi Tijerina RN on 11/9/16, patient no longer needs MEV appointments    Preventative Measures:  Medical Insurance: [employer]  ID: [id number]  Annual Physical Exam: 9/10/15    Zoroastrian/Spiritual:  Methodist    Safety:  Gets up 1-2 times a night for the bathroom  Concerns with patient leaving the apartment while the family members are sleeping  Wander Alarm installed 5/23/17    Barriers:  Language: Non-English speaking, doesn't speak very much  Intellectual Disabilities    Current Services/Support:  PCA Agency: [facility]  Phone: [phone]  PCA: Axel Pop (brother)  PCA Hours: 5 1/2 per day  County Waiver: [facility] : Julián Tay  Phone:[phone]  Fax: [phone]  PT/OT: Russ Lee

## 2021-06-12 NOTE — PROGRESS NOTES
8/8/17 at 11:29 AM  Axel Pop left a message returning my call    : Roscoe  ID: [id number]   Company: [employer]  Scheduled Follow Up Call: Attempt 1  Care Guide called Axel Pop (Brother/Legal Guardian), there was no answer and no voicemail picked up.  If he is returning my call, please transfer him to me, Kathy Martinez, at [phone].    Pending Appointments:  None  _____________________________  Planned Outreach Frequency: every 6 weeks  Preferred Phone Number: [phone]  Main /Legal Guardian: Axel Pop (brother)    Preferred : Graciela Cheney  Phone: [phone]  Agency: [facility]    Chronic Medical Diagnosis:  Headache   Insomnia    Mental Health:  Diagnosis:   Pervasive Developmental Disorders  Profound Intellectual Disabilities  Autistic Disorder  PTSD  Severe Mental Retardation  Mental Health Provider: None    Transportation:  Family    Housing:  Renting 3 bedroom apartment  Rent: $950.00/month  Portion Responsible for Rent: $300.00/month    Financial:  SSI: $733/month (began 4/2014, expires 4/2021 if citizenship not obtained)  Social : BLANCA Herron  Phone: [phone] ext. [phone]  Rep-Payee: Eddie Alvarado (sister)    Legal Immigration:  Born: [facility]  Date Came to [place]: 4/29/14  Green Card:  Resident: 4/29/14  Expires: 3/17/26  Will need to begin citizenship process on or before 4/29/19  Dr. Dent will complete the N-648 form     Substance/CD:  Smoking: Never Smoker   Smokeless Tobacco: Current User-Betal Nuts   Alcohol: Not Asked     Employment/Education:  2nd Grade    Interpersonal:  Single    Social Support:  Legal Guardian/Brother: Axel Torrez  Sister: Eddie Alvarado    Household Members (11):  Self  Sister (12 yr old)  Cousin (67 yr old female)  Sister: Eddie Alvarado  Brother-in-Law (Eddie Alvarado's )  Neices/Nephews (Eddie Alvarado's children) 4 kids  Mother: Andria Mac (also in CCC with Kathy, Clinic Care Guide)  1 additional  person    Rest/Sleep:  Lays down: 11-12PM  Gets up: 8-9AM    Nutrition:  Unknown    Exercise:  Unknown    Hygiene:  Dependent on PCA    Medications:  Medication Management: Legal Guardian/brother, Axel Torrez  Per Didi Tijerina RN on 11/9/16, patient no longer needs MEV appointments    Preventative Measures:  Medical Insurance: [employer]  ID: [id number]  Annual Physical Exam: 9/10/15    Christianity/Spiritual:  Taoism    Safety:  Gets up 1-2 times a night for the bathroom  Concerns with patient leaving the apartment while the family members are sleeping  Wander Alarm installed 5/23/17    Barriers:  Language: Non-English speaking, doesn't speak very much  Intellectual Disabilities    Current Services/Support:  PCA Agency: [facility]  Phone: [phone]  PCA: Axel Pop (brother)  PCA Hours: 5 1/2 per day  County Waiver: [facility] : Julián Tay  Phone:[phone]  Fax: [phone]  PT/OT: Russ Lee

## 2021-06-12 NOTE — PROGRESS NOTES
Clinic Care Coordination Contact  [facility]/Voicemail     ID #: [id number] Agency: [employer]    Clinical Data: Care Coordinator Outreach  Outreach attempted x 1.  Left message on patient's voicemail with call back information and requested return call.  Plan: Care Coordinator will try to reach patient again in 1-2 business days.    Next Outreach: 11/9/20

## 2021-06-12 NOTE — PROGRESS NOTES
8/4/17 at 4:21 PM  Axel Pop left a message returning my call    : Suraj Morgan  ID: [id number]   Company: [employer]  Scheduled Follow Up Call: Attempt 1  Care Guide called and left a message for Axel Pop (Brother/Legal Guardian).  If he is returning my call, please transfer him to me, Kathy Martinez, at [phone].    Pending Appointments:  None  _____________________________  Planned Outreach Frequency: every 6 weeks  Preferred Phone Number: [phone]  Main /Legal Guardian: Axel Pop (brother)    Preferred : Graciela Cheney  Phone: [phone]  Agency: [facility]    Chronic Medical Diagnosis:  Headache   Insomnia    Mental Health:  Diagnosis:   Pervasive Developmental Disorders  Profound Intellectual Disabilities  Autistic Disorder  PTSD  Severe Mental Retardation  Mental Health Provider: None    Transportation:  Family    Housing:  Renting 3 bedroom apartment  Rent: $950.00/month  Portion Responsible for Rent: $300.00/month    Financial:  SSI: $733/month (began 4/2014, expires 4/2021 if citizenship not obtained)  Social : BLANCA Herron  Phone: [phone] ext. [phone]  Rep-Payee: Eddie Alvarado (sister)    Legal Immigration:  Born: [facility]  Date Came to [place]: 4/29/14  Green Card:  Resident: 4/29/14  Expires: 3/17/26  Will need to begin citizenship process on or before 4/29/19  Dr. Dent will complete the N-648 form     Substance/CD:  Smoking: Never Smoker   Smokeless Tobacco: Current User-Betal Nuts   Alcohol: Not Asked     Employment/Education:  2nd Grade    Interpersonal:  Single    Social Support:  Legal Guardian/Brother: Axel Torrez  Sister: Eddie Alvarado    Household Members (11):  Self  Sister (12 yr old)  Cousin (67 yr old female)  Sister: Eddie Alvarado  Brother-in-Law (Eddie Alvarado's )  Neices/Nephews (Eddie Alvarado's children) 4 kids  Mother: Andria Mac (also in CCC with Kathy, Clinic Care Guide)  1 additional person    Rest/Sleep:  Lays down:  11-12PM  Gets up: 8-9AM    Nutrition:  Unknown    Exercise:  Unknown    Hygiene:  Dependent on PCA    Medications:  Medication Management: Legal Guardian/brother, Axel Torrez  Per Didi Tijerina RN on 11/9/16, patient no longer needs MEV appointments    Preventative Measures:  Medical Insurance: [employer]  ID: [id number]  Annual Physical Exam: 9/10/15    Holiness/Spiritual:  Latter day    Safety:  Gets up 1-2 times a night for the bathroom  Concerns with patient leaving the apartment while the family members are sleeping  Wander Alarm installed 5/23/17    Barriers:  Language: Non-English speaking, doesn't speak very much  Intellectual Disabilities    Current Services/Support:  PCA Agency: [facility]  Phone: [phone]  PCA: Axel Pop (brother)  PCA Hours: 5 1/2 per day  [facility] Waiver: NERI [facility] : Julián Tay  Phone:[phone]  Fax: [phone]  PT/OT: Russ Lee

## 2021-06-13 NOTE — PROGRESS NOTES
Clinic Care Coordination Contact  Care Team Conversations     SW attempted to reach patient with an , phone went right to voicemail, left voicemail.

## 2021-06-13 NOTE — PROGRESS NOTES
Clinic Care Coordination Contact  Community Health Worker Initial Outreach    CHW Initial Information Gathering:  Referral Source: Other, specify( at [facility])  Preferred Urgent Care: [facility], [phone]  Current living arrangement:: I live in a private home with family  Type of residence:: Private home - stairs  Community Resources: County Programs, PCA  Supplies Currently Used at Home: None  Equipment Currently Used at Home: none  Informal Support system:: Family  No PCP office visit in Past Year: Yes  Transportation means:: Family     : Eddie ID #: [id number] Agency: [employer]    Patient accepts CC: Yes. Patient scheduled for assessment with the SW on 11/20/20 at 10:00 am. Patient noted desire to discuss assistance with Citizenship, and assistance with paperwork. The patient's brother will bring the paperwork to the clinic for assistance to be sent to the CHW for review. The patient's brother stated the best day to call is Wednesday.     Next Outreach: 12/18/20

## 2021-06-13 NOTE — PROGRESS NOTES
: Alida Saw  ID: [id number]  Company: [employer]  Talked to: Axel Pop (brother)    Services/Support:  Spoke with Julián since ANUPAMA Lemons, spoke with her on 8/18/17  Toured an Adult Day Center  Axel Pop states he requested the patient to attend every Tuesday, Wednesday, and Thursday  On 9/19/17, Julián had informed him she would get back to him on this  Axelravi Pop states he has not heard back    Attempted a conference call  LMTCB #1 for NERI Martinez   Phone: [phone]    Pending Appointments:  None  _____________________________  Planned Outreach Frequency: every 6 weeks  Preferred Phone Number: [phone]  Main /Legal Guardian: Axel Pop (brother)    Preferred : Unknown    Chronic Medical Diagnosis:  Headache   Insomnia    Mental Health:  Diagnosis:   Pervasive Developmental Disorders  Profound Intellectual Disabilities  Autistic Disorder  PTSD  Severe Mental Retardation  Mental Health Provider: None    Transportation:  Family    Housing:  Renting 3 bedroom apartment  Rent: $950.00/month  Portion Responsible for Rent: $300.00/month    Financial:  SSI: $733/month (began 4/2014, expires 4/2021 if citizenship not obtained)  Social : BLANCA Herron  Phone: [phone] ext. [phone]  Rep-Payee: Eddie Alvarado (sister)    Legal Immigration:  Born: [place]  Date Came to [place]: 4/29/14  Green Card:  Resident: 4/29/14  Expires: 3/17/26  Will need to begin citizenship process on or before 4/29/19  Dr. Dent will complete the N-648 form     Substance/CD:  Smoking: Never Smoker   Smokeless Tobacco: Current User-Betal Nuts   Alcohol: Not Asked     Employment/Education:  2nd Grade    Interpersonal:  Single    Social Support:  Legal Guardian/Brother: Axel Torrez  Sister: Eddie Alvarado    Household Members (11):  Self  Sister (12 yr old)  Cousin (67 yr old female)  Sister: Eddie Alvarado  Brother-in-Law (Eddie Alvarado's )  Neices/Nephews  (Eddie Alvarado's children) 4 kids  Mother: Andria Mac (also in CCC with Kathy, Clinic Care Guide)  1 additional person    Rest/Sleep:  Lays down: 11-12PM  Gets up: 8-9AM    Nutrition:  Unknown    Exercise:  Unknown    Hygiene:  Dependent on PCA    Medications:  Medication Management: Legal Guardian/brother, Axel Torrez  Per Didi Tijerina RN on 11/9/16, patient no longer needs MEV appointments    Preventative Measures:  Medical Insurance: [employer]  ID: [id number]  Annual Physical Exam: 9/10/15    Presybeterian/Spiritual:  Church    Safety:  Gets up 1-2 times a night for the bathroom  Concerns with patient leaving the apartment while the family members are sleeping  Wander Alarm installed 5/23/17    Barriers:  Language: Non-English speaking, doesn't speak very much  Intellectual Disabilities    Current Services/Support:  PCA Agency: [facility]  Phone: [phone]  PCA: Axel Pop (brother)  PCA Hours: 5 1/2 per day  County Waiver: NERI [facility] : Julián Tay  Phone:[phone]  Fax: [phone]  PT/OT: Russ Lee

## 2021-06-13 NOTE — PROGRESS NOTES
Clinic Care Coordination Contact  Community Health Worker Initial Outreach       : Gaurav Agency:  [facility]    The CHW was able to call to reschedule the missed SW appointment.     The patient's Guardian stated that he waited for a call on 11/20 and did not go to work. Per the chart the phone call went straight to . The CHW will ask the SW to attempt to call twice to make sure the first call is not missed.     The CHW was able to schedule the appointment on 12/2/20 due to the Guardian having off of Wednesdays.     Next Outreach: 1/6/20

## 2021-06-14 NOTE — PROGRESS NOTES
"Subjective:  25 y.o. male with concerns of needing help with forms.  This was intended to be a form for immigration.  The visit was potentially for an N-648 form but patient and his sister who is a caregiver to him due to his disability has multiple packets from [facility] to start them with the process of citizenship application.    Outpatient Medications Prior to Visit   Medication Sig Dispense Refill     cyproheptadine (PERIACTIN) 4 mg tablet Take 1 tablet (4 mg total) by mouth daily. 90 tablet 2     ibuprofen (MOTRIN IB) 200 MG tablet Take 1-2 tablets (200-400 mg total) by mouth every 6 (six) hours as needed for pain, fever or headaches. 100 tablet 5     mirtazapine (REMERON) 30 MG tablet Take 1 tablet (30 mg total) by mouth at bedtime. 90 tablet 2     sodium chloride (OCEAN) 0.65 % nasal spray 2 sprays into each nostril 2 (two) times a day. 15 mL 12     No facility-administered medications prior to visit.       Social History     Tobacco Use   Smoking Status Never Smoker   Smokeless Tobacco Current User     Types: Chew      Objective:  /70 (Patient Site: Left Arm, Patient Position: Sitting, Cuff Size: Adult Regular)   Pulse 83   Ht 5' 2.5\" (1.588 m)   Wt 122 lb (55.3 kg)   BMI 21.96 kg/m      Assessment and Plan:   1. Pervasive Developmental Disorders  2. Profound intellectual disabilities    15 minutes spent in counseling on process for help with citizenship and coordination of care.    I do think he qualifies for N648 exemption, and can work on that at a later time.     "

## 2021-06-14 NOTE — PROGRESS NOTES
Clinic Care Coordination Contact    Community Health Worker Follow Up    Goals:   Goals Addressed                 This Visit's Progress      Psychosocial (pt-stated)        Goal Statement: I want to connect with an agency to help me with a medical waiver for citizenship within 1-2 months    Date Goal set: 12/02/20  Barriers: Unsure of process  Strengths: Family support  Date to Achieve By: 1/30/2020  Patient expressed understanding of goal: Yes  Action steps to achieve this goal:  1. I will wait to hear from [facility] for next steps (completed paperwork today and will need an N-648 with PCP).  2. I will wait to hearing from Ruth with [facility] for further assistance.   3. I will have my Axel Pop to updates ccc team on the status.     Note/comment:   -Pt have an office visit appt with PCP on 12/30/2020 and SW met with pt on 12/30/2020 per pt chart reviewed.                ID#: [id number]  Agency: [employer]     Discussion: CHW was able to connect with the patient's legal guardian/brother, Axel Pop today. Updated goal above. Legal guardian confirmed that pt is doing good, no new goal or urgent needs at this time. Encouraged pt/legal guardian to wait to hear from Ruth at [facility] (per ANUPAMA notes 12/30/2020) and connect with ccc team for updates and any other resource needs. No other questions per legal guardian.     CHW Next Follow-up: monthly.     CHW Plan: 2-.

## 2021-06-14 NOTE — PROGRESS NOTES
Clinic Care Coordination Contact    Follow Up Progress Note      Assessment: Patient was meeting with PCP today to complete an N-648 waiver. PCP spoke with SW because patient had other paperwork  For [facility].     SW met with patient to complete N-400 paperwork for [facility], made copies of ID's. SW explained they will need to come back to meet with PCP for N-648 waiver at another time. We will submit this information first.     ANUPAMA emailed paperwork to Ruth at [facility]    Goals addressed this encounter:   Goals Addressed                 This Visit's Progress      Psychosocial (pt-stated)        Goal Statement: I want to connect with an agency to help me with a medical waiver for citizenship within 1-2 months  Date Goal set: 12/02/20  Barriers: Unsure of process  Strengths: Family support  Date to Achieve By: 1/30/2020  Patient expressed understanding of goal: Yes  Action steps to achieve this goal:  1. I will wait to hear from [facility] for next steps (completed paperwork today and will need an N-648 with PCP)               Intervention/Education provided during outreach: See above          Plan:   Standard Outreach

## 2021-06-14 NOTE — PROGRESS NOTES
Clinic Care Coordination Contact:  [facility]/Voicemail    Reason: [facility] CHW Follow Up Call    : Ta   Agency: [facility]  Dept.     Clinical Data: Care Coordinator Outreach:  Outreach attempted x 1.  CHW called the patient today regarding to reason above. Spoke with the pt's brother. Pt's brother is currently at work, have the phone and patient isn't with him per brother. Left message with brother to request pt returning called. Phone number is provide.     Plan: Care Coordinator will try to reach patient again in 5-10 business days around 1-.

## 2021-06-15 NOTE — PROGRESS NOTES
Clinic Care Coordination Contact:    Community Health Worker Follow Up    Goals:   Goals Addressed                 This Visit's Progress      Psychosocial (pt-stated)   10%     Goal Statement: I want to connect with an agency to help me with a medical waiver for citizenship within 1-2 months.    Date Goal set: 12/02/20  Barriers: Unsure of process  Strengths: Family support  Date to Achieve By: 1/30/2020  Patient expressed understanding of goal: Yes  Action steps to achieve this goal:  1. I will wait to hear from [facility] for next steps (completed paperwork today and will need an N-648 with PCP).  2. I will wait to hearing from Ruth with [facility] for further assistance.   3. I will have my Axel Gayathri Castlea to updates [facility] team on the status.   4. I will updates status with CCC team.     Note/comment:   -Patient is aware of [facility] SW notes encounter 2/08/2021: message sent to Ruth with [facility] per notes. Pt appreciative of today's called and email sent to Ruth on 2/8/2021 by ANUPAMA.     (Updated: 2/24/2021)             name: Elijah Liuh  Agency: [employer]     Discussion: CHW was able to connect with the patient today. Goal updates. Check how pt is doing. Verified new goal and urgent need at this time. Pt is aware of [facility] SW notes encounter 2/8/2021-email sent to Ruth with [facility]. Encouraged pt to updates status with [facility] team. Pt reported info below and confirmed no need and questions at this time.     Patient reported: Doing well. No new goal or urgent need at this time. Hearing nothing from Ruth with [facility]. Thank you for emailed Ruth.      Outreach frequency: monthly    CHW Plan: 3-

## 2021-06-15 NOTE — PROGRESS NOTES
Clinic Care Coordination Contact  Care Team Conversations     SW attempted to reach patient with an , no answer.     ANUPAMA sent email to Ruth at [facility] to confirm if she received the packet of information we completed in December.

## 2021-06-15 NOTE — PROGRESS NOTES
"Clinic Care Coordination Contact:    Message from PCP:   From: Los Dent MD   Sent: 2/18/2021   9:57 AM CST   To: [facility] Care Guide Pool     \"Sister Eddie Alvarado   Requesting help with gaurdianship.\"    CHW Next Follow-up: monthly.      CHW Plan: 2-.    "

## 2021-06-15 NOTE — PROGRESS NOTES
HPI: This patient is a 24yo M who presents for evaluation of ear itching at the request of Dr. Dent. The discomfort started a few weeks ago and he did have a morning where th right ear bled a small amount. He has been scratching at it. No hearing concerns, no pus drainage. weeks ago.       Past medical history, past surgical history, social history, medications, and allergies have been reviewed with the patient and are documented above.    Review of Systems: an ENT specific review of systems is normal    PHYSICAL EXAMINATION:  GEN: no acute distress, normocephalic  EARS (examined under binocular microscopy): bilateral auricles normally formed. Right external auditory canal is mildly hyperemic with flaky, dry skin. Right TM is intact, and middle ear is clear. Left external auditory canal is notable for flaky, dry skin. Left TM is intact, and middle ear is clear. Findings consistent with eczematous otitis externa. No bleeding sources identified.  NECK: soft and supple. No lymphadenopathy or masses. Airway is midline.  PULM: breathing comfortably on room air, normal chest expansion with respiration    MEDICAL DECISION-MAKING: Eh is a 24yo M with what appears most consistent with eczematoid otitis externa bilaterally, no acute infection or evidence for recent bleeding. Advised on mineral oil application to help the underlying skin condition.

## 2021-06-15 NOTE — PROGRESS NOTES
: Keshia  ID: [id number]  Company: [employer]    Brother, Axel Pop returned care guides call, and left a message asking to get a call back.    Care guide then called back to talk about the patient.      Plan:  Services/Support:  Patient doesn't want to go to Adult Day.  He went once with Julián and does not want to go again.  Care guide will check on the availability at [facility] to see if there are openings at this time.  Patient likes it there and would like to keep going there.    Pending Appointments:  1/3/18 at 8:45 AM with Dr. Dent  _____________________________  Planned Outreach Frequency: every 6 weeks  Preferred Phone Number: [phone]  Main /Legal Guardian: Axel Pop (brother)    Preferred : Unknown    Chronic Medical Diagnosis:  Headache   Insomnia    Mental Health:  Diagnosis:   Pervasive Developmental Disorders  Profound Intellectual Disabilities  Autistic Disorder  PTSD  Severe Mental Retardation  Mental Health Provider: None    Transportation:  Family    Housing:  Renting 3 bedroom apartment  Rent: $950.00/month  Portion Responsible for Rent: $300.00/month    Financial:  SSI: $733/month (began 4/2014, expires 4/2021 if citizenship not obtained)  Social : BLANCA Herron  Phone: [phone] ext. [phone]  Rep-Payee: Eddie Alvarado (sister)    Legal Immigration:  Born: [facility]  Date Came to [place]: 4/29/14  Green Card:  Resident: 4/29/14  Expires: 3/17/26  Will need to begin citizenship process on or before 4/29/19  Dr. Dent will complete the N-648 form     Substance/CD:  Smoking: Never Smoker   Smokeless Tobacco: Current User-Betal Nuts   Alcohol: Not Asked     Employment/Education:  2nd Grade    Interpersonal:  Single    Social Support:  Legal Guardian/Brother: Axel Torrez  Sister: Eddie Alvarado    Household Members (11):  Self  Sister (12 yr old)  Cousin (67 yr old female)  Sister: Eddie Alvarado  Brother-in-Law (Eddie Alvarado's )  Neices/Nephews (Eddie  Shamar Alvarado's children) 4 kids  Mother: Andria Mac (also in CCC with Kathy, Clinic Care Guide)  1 additional person    Rest/Sleep:  Lays down: 11-12PM  Gets up: 8-9AM    Nutrition:  Unknown    Exercise:  Unknown    Hygiene:  Dependent on PCA    Medications:  Medication Management: Legal Guardian/brother, Axel Torrez  Per Didi Tijerina RN on 11/9/16, patient no longer needs MEV appointments    Preventative Measures:  Medical Insurance: [employer]  ID: [id number]  Annual Physical Exam: 9/10/15    Yarsanism/Spiritual:  Anabaptism    Safety:  Gets up 1-2 times a night for the bathroom  Concerns with patient leaving the apartment while the family members are sleeping  Wander Alarm installed 5/23/17    Barriers:  Language: Non-English speaking, doesn't speak very much  Intellectual Disabilities    Current Services/Support:  PCA Agency: [facility]  Phone: [phone]  PCA: Axel Pop (brother)  PCA Hours: 5 1/2 per day  [facility] Waiver: [facility]I [facility] : Julián Tay  Phone:[phone]  Fax: [phone]  PT/OT: Russ Lee

## 2021-06-15 NOTE — PROGRESS NOTES
: Roscoe  ID: [id number]  Company: [employer]    Scheduled Follow Up Call: Attempt 1  Care Guide called and left a message for the patient.  If the patient is returning my call, please transfer them to me, Hamilton Parks, at [phone].    Plan:  Services/Support:  Spoke with Julián since ANUPAMA Lemons, spoke with her on 8/18/17  Toured an Adult Day Center  Axel Pop states he requested the patient to attend every Tuesday, Wednesday, and Thursday  On 9/19/17, Julián had informed him she would get back to him on this  Axel Thaw Kiesha states he has not heard back    Attempted a conference call  LMB #1 for NERI Martinez   Phone: [phone]    Pending Appointments:  None  _____________________________  Planned Outreach Frequency: every 6 weeks  Preferred Phone Number: [phone]  Main /Legal Guardian: Axel Pop (brother)    Preferred : Unknown    Chronic Medical Diagnosis:  Headache   Insomnia    Mental Health:  Diagnosis:   Pervasive Developmental Disorders  Profound Intellectual Disabilities  Autistic Disorder  PTSD  Severe Mental Retardation  Mental Health Provider: None    Transportation:  Family    Housing:  Renting 3 bedroom apartment  Rent: $950.00/month  Portion Responsible for Rent: $300.00/month    Financial:  SSI: $733/month (began 4/2014, expires 4/2021 if citizenship not obtained)  Social : BLANCA Herron  Phone: [phone] ext. [phone]  Rep-Payee: Eddie Alvarado (sister)    Legal Immigration:  [facility]  Date Came to [place]: 4[place]29[place]14  Green Card:  Resident: 4/29/14  Expires: 3/17/26  Will need to begin citizenship process on or before 4/29/19  Dr. Dent will complete the N-648 form     Substance/CD:  Smoking: Never Smoker   Smokeless Tobacco: Current User-Betal Nuts   Alcohol: Not Asked     Employment/Education:  2nd Grade    Interpersonal:  Single    Social Support:  Legal Guardian/Brother: Axel Torrez  Sister: Eddie Ibanez  Jenny    Household Members (11):  Self  Sister (12 yr old)  Cousin (67 yr old female)  Sister: Eddie Alvarado  Brother-in-Law (Eddie Alvarado's )  Neices/Nephews (Eddie Alvarado's children) 4 kids  Mother: Andria Mac (also in CCC with Kathy, Clinic Care Guide)  1 additional person    Rest/Sleep:  Lays down: 11-12PM  Gets up: 8-9AM    Nutrition:  Unknown    Exercise:  Unknown    Hygiene:  Dependent on PCA    Medications:  Medication Management: Legal Guardian/brother, Axel Torrez  Per Didi Tijerina RN on 11/9/16, patient no longer needs MEV appointments    Preventative Measures:  Medical Insurance: [employer]  ID: [id number]  Annual Physical Exam: 9/10/15    Anabaptist/Spiritual:  Rastafari    Safety:  Gets up 1-2 times a night for the bathroom  Concerns with patient leaving the apartment while the family members are sleeping  Wander Alarm installed 5/23/17    Barriers:  Language: Non-English speaking, doesn't speak very much  Intellectual Disabilities    Current Services/Support:  PCA Agency: [facility]  Phone: [phone]  PCA: Axel Castlea (brother)  PCA Hours: 5 1/2 per day  County Waiver: [facility]  Phone:[phone]  Fax: [phone]  PT/OT: Russ Lee

## 2021-06-15 NOTE — PROGRESS NOTES
Clinic Care Coordination Contact  Care Team Conversations     CCC ANUPAMA received email from Ruth at [facility]. She is wondering if someone is able to support Eliseo Lofton with reviewing the application, if he has a medical waiver, and if he has public benefits. ANUPAMA responded informing her it appears he has support at home that could help and CCC team can also help with these documents.    Waiting to hear back on next steps

## 2021-06-16 PROBLEM — D64.9 ANEMIA: Status: ACTIVE | Noted: 2018-05-15

## 2021-06-16 NOTE — PROGRESS NOTES
Clinic Care Coordination Contact  [facility]/Voicemail    Reason: [facility] CHW Follow Up Call     ID#: [id number]   Agency: [employer]    Clinical Data: Care Coordinator Outreach:  Outreach attempted x 1.  Left message on patient's voicemail with call back information and requested return call.  Plan: Care Coordinator will try to reach patient again in 7-10 business days around 4-.

## 2021-06-16 NOTE — PROGRESS NOTES
Clinic Care Coordination Contact  Care Team Conversations     [facility] SW attempted to reach patient to follow up on if he has received medicaid letter yet, no answer. Patient has been unable to be reached by others as well.

## 2021-06-16 NOTE — PROGRESS NOTES
Clinic Care Coordination Contact  [facility]    Reason: CCC CHW Follow Up Call      name: [facility]  Agency: [facility]    Clinical Data: Care Coordinator Outreach:  Outreach attempted x 2.  Left message on patient's voicemail with call back information and requested return call.  Plan: Care Coordinator will send unable to contact letter with care coordinator contact information via mail. Care Coordinator will try to reach patient again in 1 month around 5-7-2021.

## 2021-06-16 NOTE — PROGRESS NOTES
Scheduled Follow Up Call: Attempt 2  Care Guide called and left a message for the patient.  If the patient is returning my call, please transfer them to me, Hamilton Parks, at [phone].    Plan:  Services/Support:  Patient doesn't want to go to Adult Day.  He went once with Julián and does not want to go again.  Care guide will check on the availability at [place] to see if there are openings at this time.  Patient likes it there and would like to keep going there.    Pending Appointments:  None    Next Outreach: 3/12/18  _____________________________  Planned Outreach Frequency: every 6 weeks  Preferred Phone Number: [phone]  Main /Legal Guardian: Axel Pop (brother)    Preferred : Unknown    Chronic Medical Diagnosis:  Headache   Insomnia    Mental Health:  Diagnosis:   Pervasive Developmental Disorders  Profound Intellectual Disabilities  Autistic Disorder  PTSD  Severe Mental Retardation  Mental Health Provider: None    Transportation:  Family    Housing:  Renting 3 bedroom apartment  Rent: $950.00/month  Portion Responsible for Rent: $300.00/month    Financial:  SSI: $733/month (began 4/2014, expires 4/2021 if citizenship not obtained)  Social : BLANCA Herron  Phone: [phone] ext. [phone]  Rep-Payee: Eddie Alvarado (sister)    Legal Immigration:  [facility]  Date Came to [place]: 4/29/14  Green Card:  Resident: 4/29/14  Expires: 3/17/26  Will need to begin citizenship process on or before 4/29/19  Dr. Dent will complete the N-648 form     Substance/CD:  Smoking: Never Smoker   Smokeless Tobacco: Current User-Betal Nuts   Alcohol: Not Asked     Employment/Education:  2nd Grade    Interpersonal:  Single    Social Support:  Legal Guardian/Brother: Axel Torrez  Sister: Eddie Alvarado    Household Members (11):  Self  Sister (12 yr old)  Cousin (67 yr old female)  Sister: Eddie Alvarado  Brother-in-Law (Eddie Alvarado's )  Neices/Nephews (Eddie Alvarado's children) 4  kids  Mother: Andria Mac (also in CCC with Kathy, Clinic Care Guide)  1 additional person    Rest/Sleep:  Lays down: 11-12PM  Gets up: 8-9AM    Nutrition:  Unknown    Exercise:  Unknown    Hygiene:  Dependent on PCA    Medications:  Medication Management: Legal Guardian/brother, Axel Torrez  Per Didi Tijerina RN on 11/9/16, patient no longer needs MEV appointments    Preventative Measures:  Medical Insurance: [employer]  ID: [id number]  Annual Physical Exam: 9/10/15    Latter day/Spiritual:  Mormonism    Safety:  Gets up 1-2 times a night for the bathroom  Concerns with patient leaving the apartment while the family members are sleeping  Wander Alarm installed 5/23/17    Barriers:  Language: Non-English speaking, doesn't speak very much  Intellectual Disabilities    Current Services/Support:  PCA Agency: [facility]  Phone: [phone]  PCA: Axel Pop (brother)  PCA Hours: 5 1/2 per day  County Waiver: [facility]  Phone:[phone]  Fax: [phone]  PT/OT: Russ Lee

## 2021-06-16 NOTE — PROGRESS NOTES
Clinic Care Coordination Contact  Care Team Conversations     [facility] SW attempted to reach patient to inform him, Ruth needs a copy of Medicaid benefit letter or other public benefit to submit with fee waiver. Could not reach patient today, will try again within 1-2 weeks.

## 2021-06-16 NOTE — PROGRESS NOTES
: Chirag  ID#: [id number] Agency: [employer]    Scheduled Follow Up Call: Attempt 3  Care Guide called and left a message for the patient.  If the patient is returning my call, please transfer them to me, Hamilton Parks, at [phone].    Plan:  Ask is Julián was able to connect the patient about and adult day program at [place]    Pending Appointments:  None    Next Outreach: 4/23/18  _____________________________  Planned Outreach Frequency: every 6 weeks  Preferred Phone Number: [phone]  Main /Legal Guardian: Axel Pop (brother)    Preferred : Unknown    Chronic Medical Diagnosis:  Headache   Insomnia    Mental Health:  Diagnosis:   Pervasive Developmental Disorders  Profound Intellectual Disabilities  Autistic Disorder  PTSD  Severe Mental Retardation  Mental Health Provider: None    Transportation:  Family    Housing:  Renting 3 bedroom apartment  Rent: $950.00/month  Portion Responsible for Rent: $300.00/month    Financial:  SSI: $733/month (began 4/2014, expires 4/2021 if citizenship not obtained)  Social : BLANCA Herron  Phone: [phone] ext. [phone]  Rep-Payee: Eddie Alvarado (sister)    Legal Immigration:  [facility]  Date Came to [place]: 4/29[place]14  Green Card:  Resident: 4/29/14  Expires: 3/17/26  Will need to begin citizenship process on or before 4/29/19  Dr. Dent will complete the N-648 form     Substance/CD:  Smoking: Never Smoker   Smokeless Tobacco: Current User-Betal Nuts   Alcohol: Not Asked     Employment/Education:  2nd Grade    Interpersonal:  Single    Social Support:  Legal Guardian/Brother: Axel Torrez  Sister: Eddie Alvarado    Household Members (11):  Self  Sister (12 yr old)  Cousin (67 yr old female)  Sister: Eddie Alvarado  Brother-in-Law (Eddie Alvarado's )  Neices/Nephews (Eddie Alvarado's children) 4 kids  Mother: Andria Ede Mac (also in CCC with Kathy Clinic Care Guide)  1 additional person    Rest/Sleep:  Lays down: 11-12PM  Gets up:  8-9AM    Nutrition:  Unknown    Exercise:  Unknown    Hygiene:  Dependent on PCA    Medications:  Medication Management: Legal Guardian/brother, Axel Torrez  Per Didi Tijerina RN on 11/9/16, patient no longer needs MEV appointments    Preventative Measures:  Medical Insurance: [employer]  ID: [id number]  Annual Physical Exam: 9/10/15    Uatsdin/Spiritual:  Yazidi    Safety:  Gets up 1-2 times a night for the bathroom  Concerns with patient leaving the apartment while the family members are sleeping  Wander Alarm installed 5/23/17    Barriers:  Language: Non-English speaking, doesn't speak very much  Intellectual Disabilities    Current Services/Support:  PCA Agency: [facility]  Phone: [phone]  PCA: Axel Pop (brother)  PCA Hours: 5 1/2 per day  County Waiver: [facility] : Julián Tay  Phone:[phone]  Fax: [phone]  PT/OT: Russ Lee

## 2021-06-16 NOTE — PROGRESS NOTES
Clinic Care Coordination Contact    Situation: Patient chart reviewed by care coordinator.    Background: SW completed chart review    Assessment: Patient has been unable to be reached by CHW    Plan/Recommendations: Standard Outreach

## 2021-06-16 NOTE — PROGRESS NOTES
: Terrance  ID#: [id number] Agency: [employer]    Scheduled Follow Up Call: Attempt 3  Care Guide called and left a message for the patient.  If the patient is returning my call, please transfer them to me, Hamilton Parks, at [phone].    Plan:  Services/Support:  Ask is Julián was able to connect the patient with [facility]    Pending Appointments:  None    Next Outreach: 4/23/18  _____________________________  Planned Outreach Frequency: every 6 weeks  Preferred Phone Number: [phone]  Main /Legal Guardian: Axel Pop (brother)    Preferred : Unknown    Chronic Medical Diagnosis:  Headache   Insomnia    Mental Health:  Diagnosis:   Pervasive Developmental Disorders  Profound Intellectual Disabilities  Autistic Disorder  PTSD  Severe Mental Retardation  Mental Health Provider: None    Transportation:  Family    Housing:  Renting 3 bedroom apartment  Rent: $950.00/month  Portion Responsible for Rent: $300.00/month    Financial:  SSI: $733/month (began 4/2014, expires 4/2021 if citizenship not obtained)  Social : BLANCA Herron  Phone: [phone] ext. [phone]  Rep-Payee: Eddie Alvarado (sister)    Legal Immigration:  Born: [facility]  Date Came to [place]: 4/29/14  Green Card:  Resident: 4/29/14  Expires: 3/17/26  Will need to begin citizenship process on or before 4/29/19  Dr. Dent will complete the N-648 form     Substance/CD:  Smoking: Never Smoker   Smokeless Tobacco: Current User-Betal Nuts   Alcohol: Not Asked     Employment/Education:  2nd Grade    Interpersonal:  Single    Social Support:  Legal Guardian/Brother: Axel Torrez  Sister: Eddie Alvarado    Household Members (11):  Self  Sister (12 yr old)  Cousin (67 yr old female)  Sister: Eddie Alvarado  Brother-in-Law (Eddie Alvarado's )  Neices/Nephews (Eddie Alvarado's children) 4 kids  Mother: Andria Mac (also in CCC with Kathy Clinic Care Guide)  1 additional person    Rest/Sleep:  Lays down: 11-12PM  Gets up:  8-9AM    Nutrition:  Unknown    Exercise:  Unknown    Hygiene:  Dependent on PCA    Medications:  Medication Management: Legal Guardian/brother, Axel Torrez  Per Didi Tijerina RN on 11/9/16, patient no longer needs MEV appointments    Preventative Measures:  Medical Insurance: [employer]  ID: [id number]  Annual Physical Exam: 9/10/15    Jainism/Spiritual:  Methodist    Safety:  Gets up 1-2 times a night for the bathroom  Concerns with patient leaving the apartment while the family members are sleeping  Wander Alarm installed 5/23/17    Barriers:  Language: Non-English speaking, doesn't speak very much  Intellectual Disabilities    Current Services/Support:  PCA Agency: [facility]  Phone: [phone]  PCA: Axel Pop (brother)  PCA Hours: 5 1/2 per day  County Waiver: [facility] : Julián Tay  Phone:[phone]  Fax: [phone]  PT/OT: Russ Lee

## 2021-06-17 NOTE — PROGRESS NOTES
Clinic Care Coordination Contact:  [facility]/Voicemail    Reason: CCC CHW Follow Up Called     ID#: [id number]  Agency: [employer]    Clinical Data: Care Coordinator Outreach:  Outreach attempted x 3.  CCC CHW attempted to reach the patient today at the home phone number list in chart regards to goal follow up. Left message on patient's voicemail with call back information and requested return call.    Attempt #2 for today: CHW contacted the patient at the mobile phone number list in chart and spoke with the pt's younger sister Eliseo Calhoun per CHW verified. Left message with the pt's sister requesting the patient to return called. CHW contact info and name is provide. Note/comment: Eliseo Calhoun speak English; no  service need. Though  stand-by via phone in case need.      Plan: CHW waiting to hear from the patient. Care Coordinator will try to reach patient again in 1 month around 6-. Send dis-enrollment letter to the patient if unsuccessfully outreach follow.

## 2021-06-17 NOTE — PROGRESS NOTES
Adult Physical:    CC:  cpe    HPI:  Here for check up.    Patient Active Problem List   Diagnosis     Pervasive Developmental Disorders     Profound Intellectual Disabilities     Headache     Insomnia     Multiple allergies     Past Medical History:  Past Medical History:   Diagnosis Date     Febrile seizure      Latent tuberculosis 12/11/2014     Past Surgical History:  History reviewed. No pertinent surgical history.    Medicines:  Outpatient Medications Prior to Visit   Medication Sig Dispense Refill     cyproheptadine (PERIACTIN) 4 mg tablet Take 1 tablet (4 mg total) by mouth daily. 90 tablet 1     ibuprofen (MOTRIN IB) 200 MG tablet Take 1-2 tablets (200-400 mg total) by mouth every 6 (six) hours as needed for pain. 100 tablet 0     mirtazapine (REMERON) 30 MG tablet Take 1 tablet (30 mg total) by mouth at bedtime. 90 tablet 1     dextromethorphan-guaifenesin  mg/5 mL Liqd Take 5 mL by mouth every 4 (four) hours as needed (cough). 120 mL 1     No facility-administered medications prior to visit.        Allergies:  No Known Allergies    Family History:  Family History   Problem Relation Age of Onset     No Medical Problems Sister      Social History:  Lives with brother who has PCA duties for 7 hours per day.  Sister-in-law and her parents are also present within the household.    Chews Betel nut.  Social History     Social History     Marital status: Single     Spouse name: N/A     Number of children: N/A     Years of education: N/A     Occupational History     Not on file.     Social History Main Topics     Smoking status: Never Smoker     Smokeless tobacco: Current User     Types: Chew     Alcohol use Not on file     Drug use: Not on file     Sexual activity: Not on file     Other Topics Concern     Not on file     Social History Narrative     Review of Systems:  A 12 point comprehensive review of systems was negative except as noted.    Physical Exam:  BP 90/60 (Patient Site: Right Arm, Patient  "Position: Sitting, Cuff Size: Adult Regular)  Pulse 88  Temp 98.1  F (36.7  C) (Oral)   Ht 5' 2.75\" (1.594 m)  Wt 102 lb (46.3 kg)  BMI 18.21 kg/m2  Gen:   Alert, not distressed--does not speak  Head:   Normocephalic, without obvious abnormality, atraumatic  Eyes:   PERRL, conjunctiva/corneas clear, EOM's intact  Ears:   Normal tympanic membranes and external ear canals  Nose:    Mucosa normal, no drainage or sinus tenderness  Throat:    No erythema or exudates  Neck:    No adenopathy no nodules in thyroid, normal ROM  Lungs:    Clear to auscultation bilaterally, respirations unlabored  Chest wall:  No tenderness or deformity  Heart:     Regular, normal S1 and S2, no murmur, gallop or rub  Abdomen:  Soft, non-tender, bowel sounds active all four quadrants, no masses, no organomegaly  Back:    Symmetric, no curvature, ROM normal, no CVA tenderness  Genitalia:    Normal penis/testes  Extremities: Extremities normal, atraumatic, no cyanosis or edema  Skin:   Skin color, texture, turgor normal, no rashes or lesions  Lymph nodes: Cervical, and supraclavicular, nodes normal  Neurologic: CNII-XII intact.   DTRs normal and symmetric.  Symmetric strength and sensation.  Follow directions.      Immunizations Due:  HPV    Tobacco Cessation:   Na.  Discussed betel nut reduction/cessation.    Cancer Screening:    Weight management:   The patient was counseled regarding nutrition and physical activity    Assessment and Plan:  1. Well adult exam  - Comprehensive Metabolic Panel  - Thyroid Stimulating Hormone (TSH)  - HM2(CBC w/o Differential)  - HPV vaccine 9 valent 3 dose IM    2. Hearing loss  - Ambulatory referral to Audiology    3. Pervasive Developmental Disorders  - Ambulatory referral to Audiology  - Ambulatory referral to Ophthalmology    4. Profound intellectual disabilities  - Ambulatory referral to Audiology  - Ambulatory referral to Ophthalmology    5. Latent tuberculosis infection  - XR Chest 2 Views   "

## 2021-06-17 NOTE — PROGRESS NOTES
: Roscoe ID#: [id number] Agency: [employer]  Talked to Amber Pop, patient's brother    Amber Pop stated that the patient is now going to an Adult Day program at [place].  He stated that the patient went outside in the evening starting going outside starting at 4:00 pm wandering around outside at home.  He states that he does not understand why he is leaving the house and wanting to just walk around outside.  Appointment was made with Dr. Dent on 5/7/18 at 2:30 pm.      Plan:  Talk to Dr. Dent about Maintenance due to not working on any goals right now.    Upcoming Appointments:  5/7/18 at 2:30 with Dr. Dent     Next Outreach: 6/6/18  _____________________________  Planned Outreach Frequency: every 6 weeks  Preferred Phone Number: [phone]  Main /Legal Guardian: Axel Pop (brother)    Preferred : Unknown    Chronic Medical Diagnosis:  Headache   Insomnia    Mental Health:  Diagnosis:   Pervasive Developmental Disorders  Profound Intellectual Disabilities  Autistic Disorder  PTSD  Severe Mental Retardation  Mental Health Provider: None    Transportation:  Family    Housing:  Renting 3 bedroom apartment  Rent: $950.00/month  Portion Responsible for Rent: $300.00/month    Financial:  SSI: $733/month (began 4/2014, expires 4/2021 if citizenship not obtained)  Social : BLANCA Herron  Phone: [phone] ext. [phone]  Rep-Payee: Eddie Moapa Jenny (sister)    Legal Immigration:  [facility]  Date Came to [place]: 4/29/14  Green Card:  Resident: 4/29/14  Expires: 3/17/26  Will need to begin citizenship process on or before 4/29/19  Dr. Dent will complete the N-648 form     Substance/CD:  Smoking: Never Smoker   Smokeless Tobacco: Current User-Betal Nuts   Alcohol: Not Asked     Employment/Education:  2nd Grade    Interpersonal:  Single    Social Support:  Legal Guardian/Brother: Axel Torrez  Sister: Eddie Alvarado    Household Members (11):  Self  Sister (12 yr  old)  Cousin (67 yr old female)  Sister: Eddie Alvarado  Brother-in-Law (Eddie Alvarado's )  Neices/Nephews (Eddie Alvarado's children) 4 kids  Mother: Andria Mac (also in CCC with Kathy, Clinic Care Guide)  1 additional person    Rest/Sleep:  Lays down: 11-12PM  Gets up: 8-9AM    Nutrition:  Unknown    Exercise:  Unknown    Hygiene:  Dependent on PCA    Medications:  Medication Management: Legal Guardian/brother, Axel Trinh Shan  Per Didi Tijerina RN on 11/9/16, patient no longer needs MEV appointments    Preventative Measures:  Medical Insurance: [employer]  ID: [id number]  Annual Physical Exam: 9/10/15    Restorationist/Spiritual:  Jainism    Safety:  Gets up 1-2 times a night for the bathroom  Concerns with patient leaving the apartment while the family members are sleeping  Wander Alarm installed 5/23/17    Barriers:  Language: Non-English speaking, doesn't speak very much  Intellectual Disabilities    Current Services/Support:  PCA Agency: [facility]  Phone: [phone]  PCA: Axel Pop (brother)  PCA Hours: 5 1/2 per day  [facility] Waiver: [facility] : Julián Tay  Phone:[phone]  Fax: [phone]  PT/OT: Russ Lee

## 2021-06-18 NOTE — PROGRESS NOTES
Audiology Report:    Referring Provider: Los Dent MD    History:  Eliseo Lofton is seen today for comprehensive hearing evaluation. He is accompanied to today's appointment by his brother and a Janette . Patient has a history of intellectual and developmental delays and is essentially non-verbal today. He responds to questions by nodding his head and his receptive language seems intact. There are concerns with hearing reported. Patient reports tinnitus in his left ear only. His sister has had multiple ear surgeries. He denies otalgia, otorrhea, dizziness or a history of ear surgery.     Results:     Left Ear Right Ear   Otoscopy clear canals clear canals   Pure Tone Audiometry normal hearing from 250-4000 Hz sloping to a moderate to mild sensorineural hearing loss   Normal hearing from 250-3000 Hz sloping to a moderate to moderately-severe sensorineural hearing loss   Word Recognition Unable to complete via interpeter; patient nonverbal Unable to complete via interpeter; patient nonverbal   Transient Evoked Otoacoustic Emissions (TEOAEs)  Present 1400, 2800 Hz Present 2463-7087 Hz   Distortion Product Otoacoustic Emissions (DPOAEs)  Absent Present 1794-1723 Hz   Tympanometry Very shallow/ flat (Type As) with normal ear canal volume  shallow (Type As)     Transducer: Insert earphones and Circumaural headphones    Reliability was good via conventional audiometery and there was good  SAT to PTA agreement.       Plan:  Results are discussed in detail. Recommended that patient schedule with ENT regarding asymmetrical sensorineural hearing loss.   He should return for retesting in one year to monitor, or sooner with ENT recommendation.      Please see audiogram under  media  and  audiogram  in the patient s chart.     Karla Winters, CCC-A  [place] Licensed Audiologist #[id number]

## 2021-06-18 NOTE — PROGRESS NOTES
: Mickie ID#: [id number] Agency: [employer]    Scheduled Follow Up Call: Attempt 2  Care Guide called and the patient didn't answer and there was no voicemail.  If the patient is returning my call, please transfer them to me, Hamilton Parks, at [phone].    Plan:  Talk to Dr. Dent about Maintenance due to not working on any goals right now.    Upcoming Appointments:  None     Next Outreach: 6/29/18  _____________________________  Planned Outreach Frequency: every 6 weeks  Preferred Phone Number: [phone]  Main /Legal Guardian: Axel Pop (brother)    Preferred : Unknown    Chronic Medical Diagnosis:  Headache   Insomnia    Mental Health:  Diagnosis:   Pervasive Developmental Disorders  Profound Intellectual Disabilities  Autistic Disorder  PTSD  Severe Mental Retardation  Mental Health Provider: None    Transportation:  Family    Housing:  Renting 3 bedroom apartment  Rent: $950.00/month  Portion Responsible for Rent: $300.00/month    Financial:  SSI: $733/month (began 4/2014, expires 4/2021 if citizenship not obtained)  Social : BLANCA Herron  Phone: [phone] ext. [phone]  Rep-Payee: Eddie Alvarado (sister)    Legal Immigration:  Born: [facility]  Date Came to [place]: 4[place]29[place]14  Green Card:  Resident: 4/29/14  Expires: 3/17/26  Will need to begin citizenship process on or before 4/29/19  Dr. Dent will complete the N-648 form     Substance/CD:  Smoking: Never Smoker   Smokeless Tobacco: Current User-Betal Nuts   Alcohol: Not Asked     Employment/Education:  2nd Grade    Interpersonal:  Single    Social Support:  Legal Guardian/Brother: Axel Torrez  Sister: Eddie Alvarado    Household Members (11):  Self  Sister (12 yr old)  Cousin (67 yr old female)  Sister: Eddie Alvarado  Brother-in-Law (Eddie Alvarado's )  Neices/Nephews (Eddie Alvarado's children) 4 kids  Mother: Andria Ede Mac (also in CCC with Kathy Clinic Care Guide)  1 additional person    Rest/Sleep:  Lays down:  11-12PM  Gets up: 8-9AM    Nutrition:  Unknown    Exercise:  Unknown    Hygiene:  Dependent on PCA    Medications:  Medication Management: Legal Guardian/brother, Axel Torrez  Per Didi Tijerina RN on 11/9/16, patient no longer needs MEV appointments    Preventative Measures:  Medical Insurance: [employer]  ID: [id number]  Annual Physical Exam: 9/10/15    Yarsani/Spiritual:  Confucianist    Safety:  Gets up 1-2 times a night for the bathroom  Concerns with patient leaving the apartment while the family members are sleeping  Wander Alarm installed 5/23/17    Barriers:  Language: Non-English speaking, doesn't speak very much  Intellectual Disabilities    Current Services/Support:  PCA Agency: [facility]  Phone: [phone]  PCA: Axel Pop (brother)  PCA Hours: 5 1/2 per day  [facility] Waiver: [facility] : Julián Tay  Phone:[phone]  Fax: [phone]  PT/OT: Russ Lee

## 2021-06-18 NOTE — PROGRESS NOTES
: Frank ID#: [id number] Agency: Language Line    Scheduled Follow Up Call: Attempt 1  Care Guide called and left a message for the patient.  If the patient is returning my call, please transfer them to me, Hamilton Parks, at [phone].    Plan:  Talk to Dr. Dent about Maintenance due to not working on any goals right now.    Upcoming Appointments:  None     Next Outreach: 6/19/18  _____________________________  Planned Outreach Frequency: every 6 weeks  Preferred Phone Number: [phone]  Main /Legal Guardian: Axel Pop (brother)    Preferred : Unknown    Chronic Medical Diagnosis:  Headache   Insomnia    Mental Health:  Diagnosis:   Pervasive Developmental Disorders  Profound Intellectual Disabilities  Autistic Disorder  PTSD  Severe Mental Retardation  Mental Health Provider: None    Transportation:  Family    Housing:  Renting 3 bedroom apartment  Rent: $950.00/month  Portion Responsible for Rent: $300.00/month    Financial:  SSI: $733/month (began 4/2014, expires 4/2021 if citizenship not obtained)  Social : BLANCA Herron  Phone: [phone] ext. [phone]  Rep-Payee: Eddie Alvarado (sister)    Legal Immigration:  Born: [facility]  Date Came to [place]: 4/29/14  Green Card:  Resident: 4/29/14  Expires: 3/17/26  Will need to begin citizenship process on or before 4/29/19  Dr. Dent will complete the N-648 form     Substance/CD:  Smoking: Never Smoker   Smokeless Tobacco: Current User-Betal Nuts   Alcohol: Not Asked     Employment/Education:  2nd Grade    Interpersonal:  Single    Social Support:  Legal Guardian/Brother: Axel Torrez  Sister: Eddie Alvarado    Household Members (11):  Self  Sister (12 yr old)  Cousin (67 yr old female)  Sister: Eddie Alvarado  Brother-in-Law (Eddie Alvarado's )  Neices/Nephews (Eddie Alvarado's children) 4 kids  Mother: Andria Ede Mac (also in CCC with [facility])  1 additional person    Rest/Sleep:  Lays down: 11-12PM  Gets up:  8-9AM    Nutrition:  Unknown    Exercise:  Unknown    Hygiene:  Dependent on PCA    Medications:  Medication Management: Legal Guardian/brother, Axel Torrez  Per Didi Tijerina RN on 11/9/16, patient no longer needs MEV appointments    Preventative Measures:  Medical Insurance: [employer]  ID: [id number]  Annual Physical Exam: 9/10/15    Jehovah's witness/Spiritual:  Jewish    Safety:  Gets up 1-2 times a night for the bathroom  Concerns with patient leaving the apartment while the family members are sleeping  Wander Alarm installed 5/23/17    Barriers:  Language: Non-English speaking, doesn't speak very much  Intellectual Disabilities    Current Services/Support:  PCA Agency: [facility]  Phone: [phone]  PCA: Axel Pop (brother)  PCA Hours: 5 1/2 per day  County Waiver: [facility] : Julián Tay  Phone:[phone]  Fax: [phone]  PT/OT: Russ Lee

## 2021-06-19 NOTE — PROGRESS NOTES
: Massiel ID#: [id number] Agency: Language Line    Scheduled Follow Up Call: Attempt 3  Care Guide called and the patient didn't answer and there was no voicemail.  If the patient is returning my call, please transfer them to me, Hamilton Parks, at [phone].    Plan:  Talk to Dr. Dent about Maintenance due to not working on any goals right now.    Upcoming Appointments:  None     Next Outreach: 7/30/18  _____________________________  Planned Outreach Frequency: every 6 weeks  Preferred Phone Number: [phone]  Main /Legal Guardian: Axel Pop (brother)    Preferred : Unknown    Chronic Medical Diagnosis:  Headache   Insomnia    Mental Health:  Diagnosis:   Pervasive Developmental Disorders  Profound Intellectual Disabilities  Autistic Disorder  PTSD  Severe Mental Retardation  Mental Health Provider: None    Transportation:  Family    Housing:  Renting 3 bedroom apartment  Rent: $950.00/month  Portion Responsible for Rent: $300.00/month    Financial:  SSI: $733/month (began 4/2014, expires 4/2021 if citizenship not obtained)  Social : BLANCA Herron  Phone: [phone] ext. [phone]  Rep-Payee: Eddie Alvarado (sister)    Legal Immigration:  Born: [facility]  Date Came to [place]: 4/29/14  Green Card:  Resident: 4/29/14  Expires: 3/17/26  Will need to begin citizenship process on or before 4/29/19  Dr. Dent will complete the N-648 form     Substance/CD:  Smoking: Never Smoker   Smokeless Tobacco: Current User-Betal Nuts   Alcohol: Not Asked     Employment/Education:  2nd Grade    Interpersonal:  Single    Social Support:  Legal Guardian/Brother: Axel Torrez  Sister: Eddie Alvarado    Household Members (11):  Self  Sister (12 yr old)  Cousin (67 yr old female)  Sister: Eddie Alvarado  Brother-in-Law (Eddie Alvarado's )  Neices/Nephews (Eddie Alvarado's children) 4 kids  Mother: Andria Mac (also in CCC with [facility])  1 additional person    Rest/Sleep:  Lays down:  11-12PM  Gets up: 8-9AM    Nutrition:  Unknown    Exercise:  Unknown    Hygiene:  Dependent on PCA    Medications:  Medication Management: Legal Guardian/brother, Axel Torrez  Per Didi Tijerina RN on 11/9/16, patient no longer needs MEV appointments    Preventative Measures:  Medical Insurance: [employer]  ID: [id number]  Annual Physical Exam: 9/10/15    Islam/Spiritual:  Moravian    Safety:  Gets up 1-2 times a night for the bathroom  Concerns with patient leaving the apartment while the family members are sleeping  Wander Alarm installed 5/23/17    Barriers:  Language: Non-English speaking, doesn't speak very much  Intellectual Disabilities    Current Services/Support:  PCA Agency: [facility]  Phone: [phone]  PCA: Axel Pop (brother)  PCA Hours: 5 1/2 per day  [facility] Waiver: [facility] : Julián Tay  Phone:[phone]  Fax: [phone]  PT/OT: Russ Lee

## 2021-06-19 NOTE — PROGRESS NOTES
: Elvin ID#: [id number] Agency: Language Line    First Monthly Follow up call:  Care Guide called patient.  If this patient is returning my call please transfer to  [facility] at ext [phone].  I have called this patient and have been unsuccessful in reaching this patient for 2 months now.  This patient has also not returned any of my messages.   I will continue attempting to reach out to this patient in one month. I will also check this patient s chart for upcoming appointments, ER reports that may contain a new phone number, or any other recent activity.      Plan:  Talk to Dr. Dent about Maintenance due to not working on any goals right now.    Upcoming Appointments:  None     Next Outreach: 9/5/18  _____________________________  Planned Outreach Frequency: every 6 weeks  Preferred Phone Number: [phone]  Main /Legal Guardian: Axel Pop (brother)    Preferred : Unknown    Chronic Medical Diagnosis:  Headache   Insomnia    Mental Health:  Diagnosis:   Pervasive Developmental Disorders  Profound Intellectual Disabilities  Autistic Disorder  PTSD  Severe Mental Retardation  Mental Health Provider: None    Transportation:  Family    Housing:  Renting 3 bedroom apartment  Rent: $950.00/month  Portion Responsible for Rent: $300.00/month    Financial:  SSI: $733/month (began 4/2014, expires 4/2021 if citizenship not obtained)  Social : BLANCA Herron  Phone: [phone] ext. [phone]  Rep-Payee: Eddie Alvarado (sister)    Legal Immigration:  Born: [facility]  Date Came to [place]: 4/29/14  Green Card:  Resident: 4/29/14  Expires: 3/17/26  Will need to begin citizenship process on or before 4/29/19  Dr. Dent will complete the N-648 form     Substance/CD:  Smoking: Never Smoker   Smokeless Tobacco: Current User-Betal Nuts   Alcohol: Not Asked     Employment/Education:  2nd Grade    Interpersonal:  Single    Social Support:  Legal Guardian/Brother: Axel Torrez  Sister: Eddie Ibanez  Jenny    Household Members (11):  Self  Sister (12 yr old)  Cousin (67 yr old female)  Sister: Eddie Alvarado  Brother-in-Law (Eddie Alvarado's )  Neices/Nephews (Eddie Alvarado's children) 4 kids  Mother: Andria Mac (also in CCC with [facility], [facility] Care Guide)  1 additional person    Rest/Sleep:  Lays down: 11-12PM  Gets up: 8-9AM    Nutrition:  Unknown    Exercise:  Unknown    Hygiene:  Dependent on PCA    Medications:  Medication Management: Legal Guardian/brother, Axel Torrez  Per Didi Tijerina RN on 11/9/16, patient no longer needs MEV appointments    Preventative Measures:  Medical Insurance: [employer]  ID: [id number]  Annual Physical Exam: 9/10/15    Adventism/Spiritual:  Mormonism    Safety:  Gets up 1-2 times a night for the bathroom  Concerns with patient leaving the apartment while the family members are sleeping  Wander Alarm installed 5/23/17    Barriers:  Language: Non-English speaking, doesn't speak very much  Intellectual Disabilities    Current Services/Support:  PCA Agency: [facility]  Phone: [phone]  PCA: Axel Castlea (brother)  PCA Hours: 5 1/2 per day  County Waiver: [facility]  Phone:[phone]  Fax: [phone]  PT/OT: Russ Lee

## 2021-06-19 NOTE — LETTER
Letter by Kings Vergara LGSW at      Author: Kings Vergara LGSW Service: -- Author Type: --    Filed:  Encounter Date: 9/6/2019 Status: (Other)       CARE COORDINATION    September 6, 2019    Eliseo Rolly SAHNI  [facility] [address]      Dear Eliseo,    I am a clinic care coordinator who works with Lso Dent MD at [facility] I wanted to thank you for spending the time to talk with me.  Below is a description of clinic care coordination and how I can further assist you.     The clinic care coordinator team is made up of a registered nurse,  and community health worker who understand the health care system. The goal of clinic care coordination is to help you manage your health and improve access to the health care system in the most efficient manner. The team can assist you in meeting your health care goals by providing education, coordinating services, strengthening the communication among your providers, assist you with any financial, behavioral, psychosocial, chemical dependency, counseling, and/or psychiatric resources if needed.    Please feel free to contact Hamilton Parks, the Community Health Worker, at [phone] with any questions or concerns. We are focused on providing you with the highest-quality healthcare experience possible and that all starts with you.     Sincerely,   Kings Vergara  Enclosed: I have enclosed a copy of the Care Plan. This has helpful information and goals that we have talked about. Please keep this in an easy to access place to use as needed.

## 2021-06-19 NOTE — LETTER
Letter by Kings Vergara LGSW at      Author: Kings Vergara LGSW Service: -- Author Type: --    Filed:  Encounter Date: 9/6/2019 Status: (Other)       Care Plan  About Me:    Patient Name:  Eliseo Lofton    YOB: 1995  Age:         24 y.o.   [facility] MRN:    [id number] Telephone Information:  Home Phone [phone]   Mobile [phone]       Address:  [address] [place] [id number] Email address:  [email or web address]      Emergency Contact(s)  Extended Emergency Contact Information      Name: Axel Pop  Address:       [address] [place] [address]  Relation: Legal Guardian      Name: Eddie Alvarado  Address:       [address] [place], [place] [id number]  Relation: Sibling          Primary language:  Janette     needed? Yes   [facility] [facility]:  [phone] op. 1  Other communication barriers: Language barrier, Cognitive impairment  Preferred Method of Communication:  Itzel  Current living arrangement: I live in a private home with family  Mobility Status/ Medical Equipment: Independent    Health Maintenance  Health Maintenance Reviewed: Not assessed    My Access Plan  Medical Emergency 911   Primary Clinic Line Los Dent MD - [phone]   24 Hour Appointment Line [phone] or  [phone]) (toll-free)   24 Hour Nurse Line [phone] (toll-free)   Preferred Urgent Care [facility], [phone]   [facility]  [phone]   Preferred Pharmacy [facility] [place] [address] [facility]     Behavioral Health Crisis Line The National Suicide Prevention Lifeline at 1-513.315.1576 or 911             My Care Team Members  Patient Care Team       Relationship Specialty Notifications Start End    Los Dent MD PCP - General Family Medicine  12/4/14     Merged    Phone: [phone] Fax: [phone]         [address] [place] [address]    [facility]  [facility]  Home Health Services  7/29/16     ANGEL signed 7/29/16    Phone: [phone] Fax: [phone]        [address] [place] [address]    Axel Pop, PCA/Brother Patient Care Assistant   7/29/16     Patient/Family Contact Form signed 7/29/16    Eddie Alvarado, Rep Payee/Sister    7/29/16     Patient/Family Contact Form signed 7/29/16    [facility]     7/29/16     ANGEL signed 7/29/16    Phone: [phone] Fax: [phone]        [facility], Citizenship    6/5/19     Lina Parks, CHW Community Health Worker Primary Care - CC Admissions 7/11/19     Phone: [phone] Fax: [phone]        Los Dent MD Assigned PCP   7/28/19     Phone: [phone] Fax: [phone]         [address] [facility] [address]    Melinda Farias RN Lead Care Coordinator Primary Care - CC Admissions 8/6/19     Kings Vergara LGSW Lead Care Coordinator Primary Care - [facility] Admissions 9/6/19     Fax: [phone]                 My Care Plans  Self Management and Treatment Plan  Goals and (Comments)  Goals        General    Other (pt-stated)     Notes - Note created  9/6/2019 10:29 AM by Kings Vergara LGSW    Goal Statement- I want to know the status of my citizenship case and complete the necessary paperwork within 1-2 months    Action steps to achieve this goal  1.  CCC SW left voicemail for my  and will let me know what the status is  2.  I will complete the necessary paperwork to continue my process  3.  I will  Let my CHW know if I receive any paperwork or calls from [facility] and see [facility] ANUPAMA as needed    Date goal set:  9/6/2019 BC                   Advance Care Plans/Directives Type:        My Medical and Care Information  Problem List   Patient Active Problem List   Diagnosis   ? Pervasive Developmental Disorders   ? Profound Intellectual Disabilities   ? Headache   ? Insomnia   ? Multiple allergies   ? Anemia      Current Medications and  Allergies:  See printed Medication Report.    Care Coordination Start Date: 9/6/2019   Frequency of Care Coordination:     Form Last Updated: 09/06/2019

## 2021-06-19 NOTE — LETTER
Letter by Lina Parks CHW at      Author: Lina Parks CHW Service: -- Author Type: --    Filed:  Encounter Date: 11/20/2019 Status: Signed         Dear Eliseo Lofton,                                                                         You enrolled with the [facility] Care Coordination Services.  In order for us provide guidance we need to connect on a monthly bases. I have been trying to reach you for 2 months and have been unsuccessful.  At this time, you have been disenrolled from Clinic Care Coordination and you will no longer receive follow up calls from the Clinic Care Coordination team. If you would like access to services in the future, please discuss your needs with your Primary Care Provider.      Sincerely,                                                                             MARY CARMEN Lui                                                                         Clinic Care Coordination                                         [facility]  Phone: [phone]

## 2021-06-19 NOTE — LETTER
Letter by Lina Parks CHW at      Author: Lina Parks CHW Service: -- Author Type: --    Filed:  Encounter Date: 10/17/2019 Status: Signed         Dear Eliseo Lofton and Amber Pop,                                                                          You enrolled with the [facility] Care Coordination Services.  In order for us provide guidance we need to connect on a monthly bases.  We have tried calling you 2 times in the last month and have been unsuccessful in reaching you. Please call me at [phone] at your earliest convenience.  If you reach my voicemail, please leave a message with your daytime telephone number and a date and time that I can return your call.                                                                              Sincerely,            MARY CARMEN Lui                                                                     Clinic Care Coordination                                          [facility]

## 2021-06-19 NOTE — LETTER
Letter by Lina Parks CHW at      Author: Lina Parks CHW Service: -- Author Type: --    Filed:  Encounter Date: 7/16/2019 Status: (Other)         Dear ,                                                                        On 7/13/2016 you enrolled with the [facility]'s Clinic Care Coordinatination Services.  In order for the Clinic Care Coordination team to provide guidance in completing the goals and actions steps you have established, you and I agreed we would be in contact every month.                                  We last spoke on 3/18/2019 in order to follow up on goals and action steps.  Since then, I have tried calling you 2 times in the last two weeks and have been unsuccesful in reaching you.                Please call me at [phone] at your earliest convenience.  If you reach my voicemail, please leave a message with your daytime telephone number and a date and time that I can return your call.                                                                            Sincerely,                                                                         MARY CARMEN Lui                                                                     Clinic Care Coordination                                          [facility]                                                Phone: [phone]

## 2021-06-20 NOTE — PROGRESS NOTES
: Massiel ID#: [id number] Agency: Language Line  Spoke to, Amber Pop    Right now the patient has a cold and his brother went to get some medicine and is doing better. Care guide talked to the patient's brother about moving to Maintenance and he agreed to it. There are no other concerns at this time for CCC. The patient does need a refill on medications, so Care kaitlin will talk to Dr. Dent to see if the patient needs to have an appointment to get the refills.  Care guide talked to the CA for Dr. Dent and she stated that the patient should be seen in the clinic.   ID: [id number] Agency: Language Line  Care guide called the patient's brother back and someone picked up the call but didn't say anything and on the second call the same thing happened and then the phone call went silent. Care guide will try to call at a later date.    Plan:  Talk to Dr. Dent about Maintenance due to not working on any goals right now.    Upcoming Appointments:  None     Next Outreach: 10/24/18  _____________________________  Planned Outreach Frequency: every 6 weeks  Preferred Phone Number: [phone]  Main /Legal Guardian: Axel Pop (brother)    Preferred : Unknown    Chronic Medical Diagnosis:  Headache   Insomnia    Mental Health:  Diagnosis:   Pervasive Developmental Disorders  Profound Intellectual Disabilities  Autistic Disorder  PTSD  Severe Mental Retardation  Mental Health Provider: None    Transportation:  Family    Housing:  Renting 3 bedroom apartment  Rent: $950.00/month  Portion Responsible for Rent: $300.00/month    Financial:  SSI: $733/month (began 4/2014, expires 4/2021 if citizenship not obtained)  Social : BLANCA Herron  Phone: [phone] ext. [phone]  Rep-Payee: Eddie Shamar Alvarado (sister)    Legal Immigration:  Born: [place]  Date Came to [place]: 4/29/14  Green Card:  Resident: 4/29/14  Expires: 3/17/26  Will need to begin citizenship process on or before  4/29/19  Dr. Dent will complete the N-648 form     Substance/CD:  Smoking: Never Smoker   Smokeless Tobacco: Current User-Betal Nuts   Alcohol: Not Asked     Employment/Education:  2nd Grade    Interpersonal:  Single    Social Support:  Legal Guardian/Brother: Axel Torrez  Sister: Eddie Alvarado    Household Members (11):  Self  Sister (12 yr old)  Cousin (67 yr old female)  Sister: Eddie Alvarado  Brother-in-Law (Eddie Alvarado's )  Neices/Nephews (Eddie Alvarado's children) 4 kids  Mother: Andria Mac (also in CCC with Kathy, Clinic Care Guide)  1 additional person    Rest/Sleep:  Lays down: 11-12PM  Gets up: 8-9AM    Nutrition:  Unknown    Exercise:  Unknown    Hygiene:  Dependent on PCA    Medications:  Medication Management: Legal Guardian/brother, Axel Torrez  Per Didi Tijerina RN on 11/9/16, patient no longer needs MEV appointments    Preventative Measures:  Medical Insurance: [employer]  ID: [id number]  Annual Physical Exam: 9/10/15    Methodist/Spiritual:  Pentecostalism    Safety:  Gets up 1-2 times a night for the bathroom  Concerns with patient leaving the apartment while the family members are sleeping  Wander Alarm installed 5/23/17    Barriers:  Language: Non-English speaking, doesn't speak very much  Intellectual Disabilities    Current Services/Support:  PCA Agency: [facility]  Phone: [phone]  PCA: Axel Pop (brother)  PCA Hours: 5 1/2 per day  County Waiver: [facility]I [facility] : Julián aTy  Phone:[phone]  Fax: [phone]  PT/OT: Russ Lee

## 2021-06-20 NOTE — PROGRESS NOTES
ID#: [id number] Agency: Language Line  Spoke to, Amber Pop    Patient's brother and he stated that there has not been a change for the patient. There are no health care concerns for the patient and Amber Webster stated that he would be ok with moving the patient to Maintenance. Care guide sent a message to the doctor asking about Maintenance.    Upcoming Appointments:  None     Next Outreach: 10/26/18  _____________________________  Planned Outreach Frequency: every 6 weeks  Preferred Phone Number: [phone]  Main /Legal Guardian: Axel Pop (brother)    Preferred : Unknown    Chronic Medical Diagnosis:  Headache   Insomnia    Mental Health:  Diagnosis:   Pervasive Developmental Disorders  Profound Intellectual Disabilities  Autistic Disorder  PTSD  Severe Mental Retardation  Mental Health Provider: None    Transportation:  Family    Housing:  Renting 3 bedroom apartment  Rent: $950.00/month  Portion Responsible for Rent: $300.00/month    Financial:  SSI: $733/month (began 4/2014, expires 4/2021 if citizenship not obtained)  Social : BLANCA Herron  Phone: [phone] ext. [phone]  Rep-Payee: Eddie Alvarado (sister)    Legal Immigration:  Born: [facility]  Date Came to [place]: 4/29/14  Green Card:  Resident: 4/29/14  Expires: 3/17/26  Will need to begin citizenship process on or before 4/29/19  Dr. Dent will complete the N-648 form     Substance/CD:  Smoking: Never Smoker   Smokeless Tobacco: Current User-Betal Nuts   Alcohol: Not Asked     Employment/Education:  2nd Grade    Interpersonal:  Single    Social Support:  Legal Guardian/Brother: Axel Torrez  Sister: Eddie Alvarado    Household Members (11):  Self  Sister (12 yr old)  Cousin (67 yr old female)  Sister: Eddie Alvarado  Brother-in-Law (Eddie Alvarado's )  Neices/Nephews (Eddie Alvarado's children) 4 kids  Mother: Andria Mac (also in CCC with Kathy, Clinic Care Guide)  1 additional person    Rest/Sleep:  Lays down:  11-12PM  Gets up: 8-9AM    Nutrition:  Unknown    Exercise:  Unknown    Hygiene:  Dependent on PCA    Medications:  Medication Management: Legal Guardian/brother, Axel Torrez  Per Didi Tijerina RN on 11/9/16, patient no longer needs MEV appointments    Preventative Measures:  Medical Insurance: [employer]  ID: [id number]  Annual Physical Exam: 9/10/15    Mandaeism/Spiritual:  Baptism    Safety:  Gets up 1-2 times a night for the bathroom  Concerns with patient leaving the apartment while the family members are sleeping  Wander Alarm installed 5/23/17    Barriers:  Language: Non-English speaking, doesn't speak very much  Intellectual Disabilities    Current Services/Support:  PCA Agency: [facility]  Phone: [phone]  PCA: Axel Pop (brother)  PCA Hours: 5 1/2 per day  [facility] Waiver: NERI [facility] : Julián Tay  Phone:[phone]  Fax: [phone]  PT/OT: Russ Lee

## 2021-06-21 NOTE — PROGRESS NOTES
My Clinic Care Coordination Wellness Plan  This Maintenance Wellness Plan provides private information in regards to the work I have done with my Care Team from my Primary Care Clinic.  This document provides insight on the goals I have worked hard to achieve.  My Care Team congratulates me on my journey to become well.  With the assistance of my Clinic Care Guide and Primary Care Physician,  I have succeeded in improving areas of my health that I identified as barriers to becoming well.  I will continue to seek wellness and use the skills I have obtained to further my journey.  My Care Guide will follow up with me in three months.  In the meantime, if I should have any questions or concerns I will contact [place] [address]  [phone]    My Preferred Method of Contact:  Phone: [phone]    My Primary/Preferred Language:  Janette    Preferred Learning Style:  Face to face discussion, Pictures/Diagrams and Hands on teaching    Emergency Contact: Extended Emergency Contact Information  Primary Emergency Contact: Axel Pop  Address: [address] [place] [address]  Mobile Phone: [phone]  Relation: Legal Guardian  Preferred language: Janette  Secondary Emergency Contact: Eddie Alvarado  Address: [address] [place] [address]  Mobile Phone: [phone]  Relation: Sibling     PCP:  Los Dent MD  Specialists:    Care Team            Los Dent MD PCP - General, Family Medicine    [phone]     [facility] Department of Human Services     ANGEL signed 7/13/16    [facility]    [phone]     ANGEL signed 7/29/16    Axel Pop, PCA/Brother Patient Care Assistant    Patient/Family Contact Form signed 7/29/16    Rep Stvee Payee/Sister     Patient/Family Contact Form signed 7/29/16    Jaiden Martinez  [employer] NERI Waiver      [phone]     ANGEL signed 7/29/16    [facility] Care Coordination Care Guide, Clinic Care Coordination    [facility]; Phone [phone]        Accomplishments:  Goals        Patient Stated      COMPLETED: I have begun receiving my Social Security payments again. (pt-stated)            Personal Plan:    I will go to the Social Security office if I have any further difficulties/concerns in regards to my payments.  [address] [place] [address]    I will make sure my address and phone number stay accurate with Social Security.        COMPLETED: I have gotten a wander alarm installed at home and my family feels I am safe. (pt-stated)            Personal Plan:  The wander alarm was installed on 5/23/17.  If my safety needs change, my brother will talk with Dr. Dent and/or my [facility] .        COMPLETED: I qualify for additional mental health assistance/support. (pt-stated)            Personal Plan  I will continue to go to [facility] [place].  I have my , Julián Tay who I will contact if there need to be changes regarding Adult Day.  Phone number [phone].        COMPLETED: My brother, Axel Pop, has been appointment my Legal Guardian by the court. (pt-stated)            Personal Plan:  My brother, Axel Pop, has been appointment as my Legal Guardian by the court.  Every year there is a health summary that needs to be completed and sent back to the court.  My brother will make sure to complete it once it is received in the mail each year.  If anything changes in the future and my brother is no longer able to be my Legal Guardian, he will let the court know so that a new Legal Guardian can be appointed.          COMPLETED: My family is educated on my medications and knows how to administer them to me. (pt-stated)            Personal Plan:  My family will continue to help manage my medications on a daily basis.    My  family will ask Dr. Dent, the Pharmacist at my Pharmacy, or ask to meet with Val Moya, David, at the [facility], if they have any questions regarding my medications.              Advanced Directive/Living Will: The patient was given information regarding Adanced Directives/Living Will    Emergency Plan Recommendation:     When to Use the Emergency Department (ED)  An emergency means you could die if you don t get care quickly. Or you could be hurt permanently (disabled). Read below to know when to use -- and when not to use -- an emergency department (also called ED).     Dangers to your life  Here are examples of emergencies. These need immediate care:  A hard time breathing  Severe chest pain  Choking  Severe bleeding  Suddenly not able to move or speak  Blacking out (fainting)`  Poisoning     Dangers of permanent injuries  Here are other emergencies. These also need immediate care:  Deep cuts or severe burns  Broken bones, or sudden severe pain and swelling in a joint     When it s an emergency  If you have an emergency, follow these steps:     1. Go to the nearest emergency department  If you can, go to the hospital ED closest to you right away.  If you cannot get there right away, or if it is not safe to take yourself, call 911 or your police emergency number.  2. Call your primary care doctor  Tell your doctor about the emergency. Call within 24 hours of going to the ED.  If you cannot call, have someone call for you.  Go to your doctor (not the ED) for any follow-up care.     When it s not an emergency  If a problem is not an emergency, follow these steps:     1. Call your primary care doctor  If you don t know the name of your doctor, call your health plan.  If you cannot call, have someone call for you.  2. Follow instructions  Your doctor will tell you what you should do.  You may be told to see your doctor right away. You may be told to go to the ED. Or you may be told to go to an urgent  care center.  Follow your doctor s advice.    Clinical Emergency Plan    All [facility] clinic patients have access to a Nurse 24 hours a day, 7 days a week.  If you have questions or want advice from a Nurse, please know [facility] is here for you.  You can call your clinic and they will connect you or you can call Care Connection at [phone].  [facility] also has Walk In Care clinics in multiple locations.  Call the number listed above for more information about our Walk In Care clinics or visit the [facility] website at [email or web address].

## 2021-06-21 NOTE — LETTER
Letter by Kings Vergara LGSW at      Author: Kings Vergara LGSW Service: -- Author Type: --    Filed:  Encounter Date: 12/2/2020 Status: (Other)       Care Plan  About Me:    Patient Name:  Eliseo Lofton    YOB: 1995  Age:         25 y.o.   [facility] MRN:    [id number] Telephone Information:  Home Phone [phone]   Mobile [phone]       Address:  [address] [place] [address] Email address:  [email or web address]      Emergency Contact(s)  Extended Emergency Contact Information      Name: Axel Pop  Address:       [address] [place] [address]  Relation: Legal Guardian      Name: Eddie Alvarado  Address:       [address] [place] [address]  Relation: Sibling          Primary language:  Janette     needed? Yes   [facility]:  [phone] op. 1  Other communication barriers: Language barrier, Cognitive impairment  Preferred Method of Communication:  Itzel  Current living arrangement: I live in a private home with family(Eliseo Lofton lives with family. total of 8 people)  Mobility Status/ Medical Equipment: Independent    Health Maintenance  Health Maintenance Reviewed: Not assessed    My Access Plan  Medical Emergency 911   Primary Clinic Line Los Dent MD - [phone]   24 Hour Appointment Line [phone] or  [phone]) (toll-free)   24 Hour Nurse Line [phone] (toll-free)   Preferred Urgent Care [facility] - [facility], [phone]   Preferred Hospital [facility]  [phone]   Preferred Pharmacy [facility] - [address]     Behavioral Health Crisis Line The National Suicide Prevention Lifeline at 1-715.617.1375 or 911             My Care Team Members  Patient Care Team       Relationship Specialty Notifications Start End    Los Dent MD PCP - General Family Medicine  12/4/14     Merged    Phone: [phone] Fax: [phone]  [facility] [address]    [facility]  Home Health Services  7/29/16     ANGEL signed 7/29/16    Phone: [phone] Fax: [phone]        [address] [place] [address]    Axel Pop, PCA/Brother Patient Care Assistant   7/29/16     Patient/Family Contact Form signed 7/29/16    Eddie Alvarado, Rep Payee/Sister    7/29/16     Patient/Family Contact Form signed 7/29/16    [facility]     7/29/16     ANGEL signed 7/29/16    Phone: [phone] Fax: [phone]        [facility], Citizenship    6/5/19     ANGEL Signed 9/6/19    Los Dent MD Assigned PCP   7/28/19     Phone: [phone] Fax: [phone] [address] [facility] [address]    Kings Vergara LGSW Lead Care Coordinator Primary Care - CC Admissions 12/2/20     Fax: [phone]         Lina Parks W Community Health Worker Primary Care - CC Admissions 12/2/20     Phone: [phone] Fax: [phone]                My Care Plans  Self Management and Treatment Plan  Goals and (Comments)  Goals        General    Psychosocial (pt-stated)     Notes - Note created  12/2/2020 10:09 AM by Kings Vergara LGSW    Goal Statement: I want to connect with an agency to help me with a medical waiver for citizenship within 1-2 months  Date Goal set: 12/02/20  Barriers: Unsure of process  Strengths: Family support  Date to Achieve By: 1/30/2020  Patient expressed understanding of goal: Yes  Action steps to achieve this goal:  1. I will wait to hear from scheduling for an appointment to complete an evaluation for a medical waiver  2. I will update CCC team                 Advance Care Plans/Directives Type:        My Medical and Care Information  Problem List   Patient Active Problem List   Diagnosis   ? Pervasive Developmental Disorders   ? Profound Intellectual Disabilities   ? Headache   ? Insomnia   ? Multiple allergies   ? Anemia      Current Medications and Allergies:  See  printed Medication Report.    Care Coordination Start Date: 12/2/2020   Frequency of Care Coordination:     Form Last Updated: 12/02/2020

## 2021-06-21 NOTE — PROGRESS NOTES
Wellness plan:  Emergency Plan Recommendation:    When to Use the Emergency Department (ED)  An emergency means you could die if you don t get care quickly. Or you could be hurt permanently (disabled). Read below to know when to use -- and when not to use -- an emergency department (also called ED).    Dangers to your life  Here are examples of emergencies. These need immediate care:  A hard time breathing  Severe chest pain  Choking  Severe bleeding  Suddenly not able to move or speak  Blacking out (fainting)`  Poisoning    Dangers of permanent injuries  Here are other emergencies. These also need immediate care:  Deep cuts or severe burns  Broken bones, or sudden severe pain and swelling in a joint    When it s an emergency  If you have an emergency, follow these steps:    1. Go to the nearest emergency department  If you can, go to the hospital ED closest to you right away.  If you cannot get there right away, or if it is not safe to take yourself, call 911 or your police emergency number.  2. Call your primary care doctor  Tell your doctor about the emergency. Call within 24 hours of going to the ED.  If you cannot call, have someone call for you.  Go to your doctor (not the ED) for any follow-up care.    When it s not an emergency  If a problem is not an emergency, follow these steps:    1. Call your primary care doctor  If you don t know the name of your doctor, call your health plan.  If you cannot call, have someone call for you.  2. Follow instructions  Your doctor will tell you what you should do.  You may be told to see your doctor right away. You may be told to go to the ED. Or you may be told to go to an urgent care center.  Follow your doctor s advice.

## 2021-06-21 NOTE — LETTER
Letter by Daphney Russell CHW at      Author: Daphney Russell CHW Service: -- Author Type: --    Filed:  Encounter Date: 4/7/2021 Status: (Other)        Eliseo Lofton  [address] [facility] [address]   4/7/2021       Dear Eliseo,                                                                                                                                      You enrolled with the [facility] Care Coordination Services. In order for us to provide guidance we need to connect on a monthly bases. We have tried calling you 2 times in the last month and have been unsuccessful in reaching you. Please call me at [phone] at your earliest convenience. If you reach my voicemail, please leave a message with your daytime telephone number and a date and time that I can return your call. Please feel free to contact the [facility] at [phone] with any questions. Thank you.                                                                            Sincerely,        Daphney Russell, CCC CHW                                                                     Clinic Care Coordination                                          [facility]

## 2021-06-21 NOTE — LETTER
Letter by Kings Vergara LGSW at      Author: Kings Vergara LGSW Service: -- Author Type: --    Filed:  Encounter Date: 12/2/2020 Status: (Other)       CARE COORDINATION    December 2, 2020    Eliseo Lofton  [address] [facility] [address]      Dear Eliseo,    I am a clinic care coordinator who works with Los Dent MD at [facility]. I wanted to thank you for spending the time to talk with me.  Below is a description of clinic care coordination and how I can further assist you.      The clinic care coordination team is made up of a registered nurse,  and community health worker who understand the health care system. The goal of clinic care coordination is to help you manage your health and improve access to the health care system in the most efficient manner. The team can assist you in meeting your health care goals by providing education, coordinating services, strengthening the communication among your providers and supporting you with any resource needs.    Please feel free to contact the Community Health Worker at [phone] with any questions or concerns. We are focused on providing you with the highest-quality healthcare experience possible and that all starts with you.     Sincerely,     Kings Vergara    Enclosed: I have enclosed a copy of the Care Plan. This has helpful information and goals that we have talked about. Please keep this in an easy to access place to use as needed.

## 2021-06-22 NOTE — PROGRESS NOTES
Subjective:  23 y.o. male with concerns of follow up.  Need med refills.  Insurance not active.  Not sure why.  Brother says they didn't get a renewal form.  Brother thinks out of meds for 2 months.    Has significant developmental delay, mutism.  Needs sleep med.  Now gets up at 12-1 am and wanders house.  Fairly docile disposition.  Brother not concerned for safety.  Doesn't cook food.  Actually doesn't eat unless food is brought to him and he is instructed to eat.    GEN: no fevers or chills   EYE: no vision complaints or eye pain  ENT: no sinus concerns, normal hearing and vision    Has one nose bleed per week for about three months.   No injury to nose known.  RESP: no cough or wheezing   CV: no dyspnea, palpitations, edema or chest pain   GI: no nausea, vomiting, diarrhea, or constipation   : normal urination   ENDO: no hot or cold intolerance, no polydipsia or polyuria   MS: no myalgias or arthralgias   DERM: no rash or bruising    No other sources of blood loss noticed by brother.  PSYCH: no depression concerns, able to toilet himself, no behavioral concerns of dangerous activities or hostility.    Outpatient Medications Prior to Visit   Medication Sig Dispense Refill     cyproheptadine (PERIACTIN) 4 mg tablet Take 1 tablet (4 mg total) by mouth daily. 90 tablet 1     ibuprofen (MOTRIN IB) 200 MG tablet Take 1-2 tablets (200-400 mg total) by mouth every 6 (six) hours as needed for pain. 100 tablet 5     mirtazapine (REMERON) 30 MG tablet Take 1 tablet (30 mg total) by mouth at bedtime. 90 tablet 1     dextromethorphan-guaifenesin  mg/5 mL Liqd Take 5 mL by mouth every 4 (four) hours as needed (cough). 120 mL 1     No facility-administered medications prior to visit.       Social History     Tobacco Use   Smoking Status Never Smoker   Smokeless Tobacco Current User     Types: Chew      Objective:  BP 90/50 (Patient Site: Right Arm, Patient Position: Sitting, Cuff Size: Adult Small)   Pulse 96   Wt  (!) 96 lb (43.5 kg)   BMI 17.14 kg/m    GENERAL: alert, not distressed  EYES: PERRL/EOMI, no scleral icterus, no conjunctival injection   EARS: normal tympanic membranes and external auditory canals bilaterally  PHARYNX: no erythema or exudates  MOUTH: well hydrated mucosa, no lesions  NECK: no lymphadenopathy or thyroid nodules  CHEST: clear, no rales, rhonchi, or wheezes  CARDIAC: regular without murmur, gallop, or rub  ABDOMEN: soft, non tender, non distended, normal bowel sounds    Recent Results (from the past 24 hour(s))   HM2(CBC w/o Differential)   Result Value Ref Range    WBC 3.5 (L) 4.0 - 11.0 thou/uL    RBC 4.88 4.40 - 6.20 mill/uL    Hemoglobin 14.4 14.0 - 18.0 g/dL    Hematocrit 44.4 40.0 - 54.0 %    MCV 91 80 - 100 fL    MCH 29.6 27.0 - 34.0 pg    MCHC 32.5 32.0 - 36.0 g/dL    RDW 12.6 11.0 - 14.5 %    Platelets 291 140 - 440 thou/uL    MPV 8.6 7.0 - 10.0 fL        Assessment and Plan:   1. Insomnia  Restart  - mirtazapine (REMERON) 30 MG tablet; Take 1 tablet (30 mg total) by mouth at bedtime.  Dispense: 90 tablet; Refill: 1    2. Decreased appetite  Renew:  - cyproheptadine (PERIACTIN) 4 mg tablet; Take 1 tablet (4 mg total) by mouth daily.  Dispense: 90 tablet; Refill: 1    3. Acute pain of both knees  Use for pain as needed:  - ibuprofen (MOTRIN IB) 200 MG tablet; Take 1-2 tablets (200-400 mg total) by mouth every 6 (six) hours as needed for pain, fever or headaches.  Dispense: 100 tablet; Refill: 5    4. Recurrent epistaxis  Check for coag problem, but more likely benign reaction to dry winter air.  See ent, nasal irrigation.  - sodium chloride (OCEAN) 0.65 % nasal spray; 2 sprays into each nostril 2 (two) times a day.  Dispense: 15 mL; Refill: 12  - Ambulatory referral to ENT  - HM2(CBC w/o Differential)  - Basic Metabolic Panel  - INR  - APTT(PTT)     Will ask care guides to help with insurance issues.    This visit was conducted with the aid of a professional .

## 2021-06-22 NOTE — PROGRESS NOTES
Programs applying for: Reapply for [facility] Case#:  Financial worker:  ZAMZAM Case #:  Insurance Tracking:  PMI#:[id number]    Outreach:   1/30/19: FRG checked [place], patient has active health insurance. Patient let Care Guide know that he is aware of this and is waiting for insurance card. No further needs from [facility] at this time. Removing patient from [facility] panel.     1/16/19: FRG called patient, he sent in renewal paperwork yesterday 1/15/19. He is going to check on the status himself. FRG will keep patient on panel for 2 weeks to check status in [place].    1/10/19: Patient no showed appointment, FRG will call patient next week to reschedule.     1/7/19: FRG called [facility], patient did not complete healthcare renewal form, advised to complete and send to [facility]. FRG called patient to discuss, scheduled apt on 1/10 at 3:00pm at [facility].       Insurance Information Updated:   [place]: Medical Assistance.  Elig Type DX: Disabled  Eligibility Begin Date: 01/01/2016  Eligibility End Date: --/--/----    MA37 - Special Needs Basic[employer] (SNBC) delivered through [employer].     Referral:   The patient does not have insurance. Care guide checked [place] and he is not showing as active.     Thank you,   [facility]     Health Insurance Screening: RENEWAL    1. Do you currently have Health Insurance? No     2.How long have you been without Health Insurance? 12/31/18    3. Did you receive letter that insurance was terming? No    4. Do you file taxes, does anyone claim you as dependent? Claims spouse and children, will bring in green cards.     5. What is your monthly income? (this includes all individuals you file taxes with)? SSI disability, bringing statement. Spouse works, bringing last 3 months of paystubs.     6. Do you have any bank accounts? Yes,  Will bring statements.

## 2021-06-23 NOTE — PROGRESS NOTES
ID#: [id number] Agency: [employer]    The patient received a letter stating that he has insurance, but did not receive the card. The Care guide informed him that the card may take a little bit to have it sent from the [facility]. The patient stated that he understood. He did ask about his Citizenship and the Care guide stated that he will need to wait until the end of April before he can apply for Citizenship. The patient understood. The Care guide will plan for the next outreach for the beginning of April. A new goal of Citizenship has been added.    Upcoming Appointments:  None     Next Outreach: 3/28/19  _____________________________  Planned Outreach Frequency: every 6 weeks  Preferred Phone Number: [phone]  Main /Legal Guardian: Axel Trinh Kiesha (brother)    Legal Immigration:  [facility]  Date Came to [place]: 4[birth year]29/14  Green Card:  Resident: 4/29/14  Expires: 3/17/26  Will need to begin citizenship process on or before 4/29/19  Dr. Dent will complete the N-648 form     Social Support:  Legal Guardian/Brother: Axel Trinh Shan  Sister: Eddie Alvarado

## 2021-06-25 NOTE — PROGRESS NOTES
1) Visit type: Paperwork/Forms N/A  a. Does the paperwork or form have a due date?  If yes, what is the date?  b. How many pages of paperwork are there?   **If forms or paperwork are five or more pages or multiple issues (2-3) need to be addressed, prepare the patient that it may take more than one visit with the SW to address all issues.**  c. What is the type of paperwork? RE: Disability, other    2) Visit Type: Housing N/A  a. Is patient homeless or at-risk of becoming homeless in the next 1-2 months?  b. Is the patient looking for other types housing resources?    3) Visit Type:  Safety Concerns N/A  a. Do you suspect abuse, neglect or unsafe living environment?   b. Has abuse, neglect or unsafe living environment been voiced by the patient or discussed with the patient?   c. Is the county involved in the case- RE: vulnerable adult or child protective services?    4) Visit Type:  Patient/Family Care Conference N/A  a. In your discussion with the patient do you sense that a  facilitated care conference would be beneficial?  b. Are there multiple complex social factors complicating coordination of care? If yes, brief summary:    5) Visit Type: Mental Health/Yellow Medicine Setting N/A  a. Does the patient have support outside of the Care Guide for mental health needs?  b. Has the patient had a recent major life event?  c. Has the patient had a negative change in behavior from baseline?  d. Does the patient push Care Guide boundaries (for example repetitive calling, unscheduled drop- ins, despite redirection from the care guide)?   e. If questioning boundaries- ASK!      Are you working with any other Social workers or providers on this issue?  No. The patient needs a referral for Citizenship.

## 2021-06-25 NOTE — PROGRESS NOTES
: Roscoe ID#: [id number] Agency: [employer]    The patient called the Care guide to get an appointment with Dr. Dent for his medication. The Care guide was able to make an appointment with the doctor on 3/25/19. The Care guide then asked about starting the patient's Citizenship ans was able to schedule an appointment with the SW on 4/19/19 to refer to an agency that is able to help with Citizenship. The patient came to the [place] on [place]. No other concerns.     Upcoming Appointments:  3/25/19 at 2:45 with Dr. Dent  4/19/19 at 2:00 with [facility] ANUPAMA     Next Outreach: 5/20/19  _____________________________  Planned Outreach Frequency: every 6 weeks  Preferred Phone Number: [phone]  Main /Legal Guardian: Axle Pop (brother)    Legal Immigration:  Born: [facility]  Date Came to [place]: 4/29[place]14  Green Card:  Resident: 4/29/14  Expires: 3/17/26  Will need to begin citizenship process on or before 4/29/19  Dr. Dent will complete the N-648 form     Social Support:  Legal Guardian/Brother: Axel Torrez  Sister: Eddie Alvarado

## 2021-06-26 NOTE — PROGRESS NOTES
Clinic Care Coordination Contact:    Community Health Worker Follow Up    Goals:   Goals Addressed    None        name: Way  Agency: [employer]      Discussion: CCC CHW was able to connect with the patient's sister today without pt present. Pt currently visiting his brother at the moment. No consent sign to speak with Eddie Alvarado per pt chart reviewed. No pt info shared. Left message with pt's sister to relayed it to pt and requesting a returning called. The sister reported info below and questioned how patient can complete consent to CCC team to communicate with her regarding to coordinate cares. CHW refer pt to connect with [facility] team or go to the [facility] in person to complete consent. Sister confirmed understand and no other questions or concerns.     Patient's sister reported:  -Axel Pop work in the day time, cannot answer his phone, and no longer have time to assist needs for Eliseo Lofton.   -Eliseo Lofton is now living with me. I assisted his needs and coordinate care for him. Eliseo Lofton is doing well. No other questions.      CHW Next Follow-up: goal follow up    Outreach frequency: monthly     CHW Plan: Waiting to hear from patient. Next CHW outreach attempt: 6/21/2021.

## 2021-06-26 NOTE — PROGRESS NOTES
"Items needed for Ruth at [facility]:    \"I had emailed the clients brother to provide me with court documents showing that he is the legal guardian (and not something else, like power of ).    Further, he said he would get me the front and back of his brother's green card. I donâ  t have the file in front of me so the brother will have to confirm whether this was sent.    I still need the SSI cut off notice, proof of public benefits and a medical waiver    Have you tried speaking to the client? Can he answer basic questions like his name and ? If not, then he would need a legal guardian or designated representative.    The medical waiver should contain language that client has a legal guardian (should state who the guardian is) and/or designated representative (should state who this person is, his/her relationship to the client, and that this person cares for the client). \"  "

## 2021-06-28 NOTE — PROGRESS NOTES
Progress Notes by Kings Vergara LGSW at 9/6/2019  9:00 AM     Author: Kings Vergara LGSW Service: -- Author Type:     Filed: 9/6/2019 10:39 AM Encounter Date: 9/6/2019 Status: Signed    : Kings Vergara LGSW ()       Clinic Care Coordination Contact    SW completed re-assessment with Axel (patient's guardian/older brother), , and Eh. Eh was present for today's appointment, however does not communicate verbally.    Current concerns are related to citizenship. SW completed and updated goal to reflect current concerns.    SW explained process and phases of CCC.    Clinic Care Coordination Contact  OUTREACH    Referral Information:  Referral Source: PCP    Primary Diagnosis: Psychosocial    Chief Complaint   Patient presents with   ? Clinic Care Coordination - Initial   ? Clinic Care Coodination - Face To Face        Universal Utilization:   Clinic Utilization  Difficulty keeping appointments:: No  Compliance Concerns: No  No-Show Concerns: No  No PCP office visit in Past Year: No  Utilization    Last refreshed: 9/6/2019  9:25 AM:  Hospital Admissions 0           Last refreshed: 9/6/2019  9:25 AM:  ED Visits 0           Last refreshed: 9/6/2019  9:25 AM:  No Show Count (past year) 1              Current as of: 9/6/2019  9:25 AM              Clinical Concerns:  Current Medical Concerns:  Medical concerns being addressed as needed by PCP    Current Behavioral Concerns: Axel reported they get concerned about Eh wandering, but they do have the wander alarm now that helps    Education Provided to patient: Informed of CCC process and role   Pain  Pain (GOAL):: Yes  Location of chronic pain:: Back and Knee Pain  Health Maintenance Reviewed: Not assessed  Clinical Pathway: None     Medication Management:  No concerns     Functional Status:  Dependent ADLs:: Bathing, Dressing, Eating, Grooming  Dependent IADLs:: Cleaning, Cooking, Laundry, Shopping, Meal Preparation,  Medication Management, Money Management, Transportation  Bed or wheelchair confined:: No  Mobility Status: Independent  Fallen 2 or more times in the past year?: No  Any fall with injury in the past year?: No  Eh has 7.50 hours of PCA per day, Axel's sister is Eh's PCA.   Living Situation:  Current living arrangement:: I live in a private home with family  Type of residence:: Private home - stairs  Eh lives with family in a rented house. Axel reported they will be moving into a house at the end of the month that they will now own.  Diet/Exercise/Sleep:  Inadequate nutrition (GOAL):: No  Food Insecurity: No  Tube Feeding: No  Inadequate activity/exercise (GOAL):: No  Significant changes in sleep pattern (GOAL): No  Axel reported Eh needs prompting to eat and go to bed. If they don't prompt him, he won't eat. ANUPAMA provided Axel with information on Fare for All and the free [employer].   Transportation:  Transportation concerns (GOAL):: No  Transportation means:: Family, Regular car   No transportation concerns reported.  Psychosocial:  Hinduism or spiritual beliefs that impact treatment:: No  Mental health DX:: No  Mental health management concern (GOAL):: No  Informal Support system:: Family   Axel reported he feels Eh is very well taken care of. Axel is Eh's guardian and has completed the renewal for this year. He also reported Eh had his annual PCA assessment. Axel did report that the current CADI  isn't as helpful as previous , but he has been able to complete all the necessary items for Eh's care.  Eliseo has been referred to [facility] for citizenship help. Axel is unsure of 's status because he thinks the mail from [facility] may have been thrown away. ANUPAMA contacted [facility] to inquire about status and will update Axel.   Financial/Insurance:   Financial/Insurance concerns (GOAL):: No  No concerns reported.     Resources and Interventions:  Current Resources:    ;   Community Resources: County Worker, PCA    Eh has a CADI  and PCA services.        Advance Care Plan/Directive  Advanced Care Plans/Directives on file:: No  Advanced Care Plan/Directive Status: Considering Options    Referrals Placed: None     Goals:   Goals        General    Other (pt-stated)     Notes - Note created  9/6/2019 10:29 AM by Kings Vergara LGSW    Goal Statement- I want to know the status of my citizenship case and complete the necessary paperwork within 1-2 months    Action steps to achieve this goal  1.  CCC SW left voicemail for my  and will let me know what the status is  2.  I will complete the necessary paperwork to continue my process  3.  I will  Let my CHW know if I receive any paperwork or calls from [facility] and see [facility] ANUPAMA as needed    Date goal set:  9/6/2019 BC                  Patient/Caregiver understanding: Axel reported understanding role and process of [facility]           Plan: SW contacted [facility] to inquire about Eh's status, will wait to hear from [facility] and will update Axel

## 2021-06-30 NOTE — PROGRESS NOTES
Progress Notes by Kings Vergara LGSW at 12/2/2020  9:00 AM     Author: Kings Vergara LGSW Service: -- Author Type:     Filed: 12/2/2020 10:15 AM Encounter Date: 12/2/2020 Status: Signed    : Kings Vergara LGSW ()       Clinic Care Coordination Contact  CCC SW spoke with Thaw on the phone with an  to complete an assessment for Eliseo Lofton to enroll into [facility].    SW routed message to PCP to ask about a referral for Medical Waiver.     ANUPAMA emailed Ruth Luz [facility], to update that we have connected with patient.       Clinic Care Coordination Contact  OUTREACH    Referral Information:  Referral Source: Other, specify( at [facility])    Primary Diagnosis: Psychosocial    Chief Complaint   Patient presents with   ? Clinic Care Coordination - Initial        Universal Utilization:   Clinic Utilization  Difficulty keeping appointments:: No  Compliance Concerns: No  No-Show Concerns: No  No PCP office visit in Past Year: Yes  Utilization    Last refreshed: 12/2/2020  9:27 AM: Hospital Admissions 0           Last refreshed: 12/2/2020  9:27 AM: ED Visits 0           Last refreshed: 12/2/2020  9:27 AM: No Show Count (past year) 1              Current as of: 12/2/2020  9:27 AM              Clinical Concerns:  Current Medical Concerns:  None reported    Current Behavioral Concerns: None reported    Education Provided to patient: Role of CCC   Pain  Pain (GOAL):: Yes  Health Maintenance Reviewed: Not assessed  Clinical Pathway: None     Medication Management:   No concerns     Functional Status:  Dependent ADLs:: Bathing, Dressing, Grooming  Dependent IADLs:: Cleaning, Cooking, Laundry, Shopping, Meal Preparation, Transportation  Bed or wheelchair confined:: No  Mobility Status: Independent  Fallen 2 or more times in the past year?: No  Any fall with injury in the past year?: No    Living Situation:  Current living arrangement:: I live in a private home with family(Eliseo Lofton  lives with family. total of 8 people)  Type of residence:: Private home - stairs    Lifestyle & Psychosocial Needs:        Diet:: Regular(Diet/Appetite: Family prompts him to eat, Have enough food to eat)  Inadequate nutrition (GOAL):: No  Tube Feeding: No  Inadequate activity/exercise (GOAL):: No  Significant changes in sleep pattern (GOAL): No  Transportation means:: Family     Catholic or spiritual beliefs that impact treatment:: No  Mental health DX:: No  Mental health management concern (GOAL):: No  Informal Support system:: Family   Socioeconomic History   ? Marital status: Single     Spouse name: Not on file   ? Number of children: Not on file   ? Years of education: Not on file   ? Highest education level: Not on file     Tobacco Use   ? Smoking status: Never Smoker   ? Smokeless tobacco: Current User     Types: Chew     Patients Ruth vasquez, reached out to [facility] team to ask for support with Eliseo Darnellay getting a medical waiver. They have been working with the patient for some time and are needing assistance getting them connected so they can move forward with his case.            Resources and Interventions:  Current Resources:      Community Resources: County Programs, PCA(PCA: 7.5 hours daily)  Supplies Currently Used at Home: None  Equipment Currently Used at Home: none  Type of Employment: Disability       Advance Care Plan/Directive  Advanced Care Plans/Directives on file:: No  Advanced Care Plan/Directive Status: Not Applicable          Goals:   Goals        General    Psychosocial (pt-stated)     Notes - Note created  12/2/2020 10:09 AM by Kings Vergara LGSW    Goal Statement: I want to connect with an agency to help me with a medical waiver for citizenship within 1-2 months  Date Goal set: 12/02/20  Barriers: Unsure of process  Strengths: Family support  Date to Achieve By: 1/30/2020  Patient expressed understanding of goal: Yes  Action steps to achieve this goal:  1. I will wait to hear  from scheduling for an appointment to complete an evaluation for a medical waiver  2. I will update CCC team              Patient/Caregiver understanding: Reported understanding           Plan: Standard Outreach

## 2021-07-20 ENCOUNTER — PATIENT OUTREACH (OUTPATIENT)
Dept: CARE COORDINATION | Facility: CLINIC | Age: 26
End: 2021-07-20

## 2021-07-20 NOTE — PROGRESS NOTES
Clinic Care Coordination Contact    Situation: Patient chart reviewed by carecoordinator.    Background: SW completed chart review    Assessment: Patient has been unable to be reached by CHW    Plan/Recommendations: Standard Outreach  
Yes

## 2021-07-27 ENCOUNTER — PATIENT OUTREACH (OUTPATIENT)
Dept: NURSING | Facility: CLINIC | Age: 26
End: 2021-07-27
Payer: MEDICAID

## 2021-07-28 ENCOUNTER — PATIENT OUTREACH (OUTPATIENT)
Dept: CARE COORDINATION | Facility: CLINIC | Age: 26
End: 2021-07-28

## 2021-08-02 ENCOUNTER — TELEPHONE (OUTPATIENT)
Dept: FAMILY MEDICINE | Facility: CLINIC | Age: 26
End: 2021-08-02

## 2021-08-02 DIAGNOSIS — Z71.89 COUNSELING AND COORDINATION OF CARE: Primary | ICD-10-CM

## 2021-08-02 NOTE — TELEPHONE ENCOUNTER
Reason for Call:  Other     Detailed comments: would like to talk to Matt about paperwork for sister to become legal guardian   Phone Number Patient can be reached at: Home number on file [phone]    best Time: anytime  Can we leave a detailed message on this number? YES    Call taken on 8/2/2021 at 12:11 PM by Diana Tay

## 2021-08-03 ENCOUNTER — PATIENT OUTREACH (OUTPATIENT)
Dept: NURSING | Facility: CLINIC | Age: 26
End: 2021-08-03

## 2021-08-03 NOTE — PROGRESS NOTES
Clinic Care Coordination Contact  Community Health Worker Follow Up    Goals:   Goals Addressed as of 8/3/2021 at 10:59 AM                    7/27/21       Psychosocial (pt-stated)   30%    Added 7/20/21 by Kings Vergara, [facility]      Goal Statement: I want to connect with an agency to help me with a medical waiver for citizenship within 1-2 months.     Date Goal set: 12/02/20   Barriers: Unsure of process   Strengths: Family support   Date to Achieve By: 1/30/2020   Patient expressed understanding of goal: Yes   Action steps to achieve this goal:   1. I will wait to hear from [facility] for next steps (completed paperwork today and will need an N-648 with PCP).   2. I will wait to hearing from Ruth with [facility] for further assistance.   3. I will updates status with CCC team.     Note/comment:   -Patient is aware of [facility] notes encounter 2/08/2021: message sent to Ruth with [facility] per notes. Pt appreciative of today's called and email sent to Ruth on 2/8/2021 by ANUPAMA.     (Updated: 7-)          : Elijah Crystal  Agency: [employer]    Discussion:   [facility] CHW was able to connect with pt today regarding to goal follow up. Encouraged pt to returning [facility] called. CHW offered appt follow up with [facility] SW to discuss goal progression per message received from [facility] SW (fyi: encounter 6/28/2021 for details if need). Pt agreed. Pt appt scheduled 8/2 at 11:00AM with [facility] SW via phone visit. Pt is aware SW will contact pt. Pt confirmed doing well and no other questions at this time.     CHW Next Follow-up: goal follow up     Outreach frequency: monthly     CHW Plan: 8-

## 2021-08-04 NOTE — PROGRESS NOTES
8/3/2021  Clinic Care Coordination Contact  Community Health Worker Initial Outreach    [facility] Janette  : Jarvis  Called and spoke to patient through Janette  and schedule assessment with CCC SW to 8-11-21 at 2pm.  Patient confirmed appt.    Routed to MARY CARMEN Mcgraw as MATI

## 2021-08-11 ENCOUNTER — PATIENT OUTREACH (OUTPATIENT)
Dept: NURSING | Facility: CLINIC | Age: 26
End: 2021-08-11

## 2021-08-11 DIAGNOSIS — Z71.89 COUNSELING AND COORDINATION OF CARE: ICD-10-CM

## 2021-08-11 NOTE — LETTER
[facility]  Complex Care Plan  About Me:    Patient Name:  Eliseo Lofton    YOB: 1995  Age:         26 year old   [facility] MRN:    [id number] Telephone Information:  Home Phone [phone]   Mobile [phone]       Address:  [address] [facility] [place] [address] Email address:  [email or web address]      Emergency Contact(s)    Name Relationship Lgl Grd Work Phone Home Phone Mobile Phone   1. TYREE HARKINS Other    [phone]   2. LEWIS HERNÁNDEZ Other    [phone]           Primary language:  Janette     needed? Yes   [place] [employer]:  [phone] op. 1  Other communication barriers:    Preferred Method of Communication:     Current living arrangement: I live in a private home with family  Mobility Status/ Medical Equipment: Independent    Health Maintenance  Health Maintenance Reviewed: Not assessed    My Access Plan  Medical Emergency 911   Primary Clinic Line [facility] - [phone]   24 Hour Appointment Line [phone] or  [phone] ([phone]) (toll-free)   24 Hour Nurse Line [phone] (toll-free)   Preferred Urgent Care [facility], [phone]   Preferred Hospital [facility]  [phone]   Preferred Pharmacy [facility] - [address]     Behavioral Health Crisis Line The National Suicide Prevention Lifeline at 1-588.115.6198 or 911             My Care Team Members  Patient Care Team       Relationship Specialty Notifications Start End    Los Dent MD PCP - General   12/4/14     Phone: [phone] Fax: [phone]         [address] [place] [address]    Los Dent MD Assigned PCP   6/16/21     Phone: [phone] Fax: [phone]         [address] [place] [address]    Lindsay Sommers MD Assigned Surgical Provider   7/16/21     Phone: [phone] Fax: [phone]         ENT SPECIALTY CARE [facility] FERNANDO MCCAULEY [facility] [address] [place] [address]  [place] [address]    Kings Vergara LICSW Lead Care Coordinator Primary Care - CC Admissions 8/11/21     Daphney Russell Community Health Worker Primary Care - CC Admissions 8/11/21             My Care Plans  Self Management and Treatment Plan  Goals and (Comments)  Goals        General     Psychosocial (pt-stated)      Notes - Note edited  8/11/2021  2:22 PM by Kings Vergara LICSW     Goal Statement: I want to connect with an agency to help me with a medical waiver for citizenship within 1-2 months.     Date Goal set: 12/02/20   Barriers: Unsure of process   Strengths: Family support   Date to Achieve By: 1/30/2020   Patient expressed understanding of goal: Yes   Action steps to achieve this goal:   1. I will wait to hear if my sister will be my guardian. [facility] SW will support with checking the status of this  2. Once I can confirm guardianship, then we can continue to work with Ruth     Note/comment:   -Patient is aware of [facility] SW notes encounter 2/08/2021: [facility]. Pt appreciative of today's called and email sent to Ruth on 2/8/2021 by ANUPAMA.                  Action Plans on File:                       Advance Care Plans/Directives Type:        My Medical and Care Information  Problem List   Patient Active Problem List   Diagnosis     Pervasive Developmental Disorders     Profound Intellectual Disabilities     Headache     Insomnia     Multiple allergies     Anemia      Current Medications and Allergies:  See printed Medication Report.    Care Coordination Start Date: 12/2/2020   Frequency of Care Coordination: monthly   Form Last Updated: 08/11/2021

## 2021-08-11 NOTE — PROGRESS NOTES
Clinic Care Coordination Contact  CCC ANUPAMA contacted patient's sister, Pa Shamar Jenny with an  to complete an updated assessment for enrollment into [facility].    Jose Alvarado reported that Eliseo Lombardo benefits have all stopped because he isn't a citizen. She is really concerned about getting this done. SW reviewed note from Ruth at [facility]. She needs paperwork to continue the application but she also needs to know if Eliseo Lofton has a guardian or not. Jose Alvarado couldn't confirm this. ANUPAMA checked [place] [address] records. Eliseo Lofton's brother was discharged from being his guardian and the last status says there is a petition to appoint a new one. Jose Alvarado thinks she is going to be his guardian moving forward, but doesn't know for sure. Said she signed paperwork and mailed it in a couple of weeks ago. ANUPAMA suggested we work on confirming this before we proceed. SW will check in with her next week to see if she received any more information.       Clinic Care Coordination Contact  OUTREACH    Referral Information:  Referral Source: PCP    Primary Diagnosis: Psychosocial    Chief Complaint   Patient presents with     Clinic Care Coordination - Initial        Universal Utilization:   Clinic Utilization  Difficulty keeping appointments:: No  Compliance Concerns: No  No-Show Concerns: No  No PCP office visit in Past Year: No  Utilization    Hospital Admissions  0             ED Visits  0             No Show Count (past year)  1                Current as of: 8/4/2021 10:04 AM              Clinical Concerns:  Current Medical Concerns:  None reported    Current Behavioral Concerns: None reported    Education Provided to patient: Role of CCC   Pain  Pain (GOAL):: No  Health Maintenance Reviewed: Not assessed  Clinical Pathway: None    Medication Management:  Medication review status: Medications reviewed and no changes reported per patient.             Functional Status:  Bed or wheelchair confined:: No  Mobility Status: Independent  Fallen 2 or  more times in the past year?: No  Any fall with injury in the past year?: No    Living Situation:  Current living arrangement:: I live in a private home with family  Type of residence:: Private home - stairs    Lifestyle & Psychosocial Needs:    Social Determinants of Health     Tobacco Use: High Risk     Smoking Tobacco Use: Never Smoker     Smokeless Tobacco Use: Current User   Alcohol Use:      Frequency of Alcohol Consumption:      Average Number of Drinks:      Frequency of Binge Drinking:    Financial Resource Strain:      Difficulty of Paying Living Expenses:    Food Insecurity:      Worried About Running Out of Food in the Last Year:      Ran Out of Food in the Last Year:    Transportation Needs:      Lack of Transportation (Medical):      Lack of Transportation (Non-Medical):    Physical Activity:      Days of Exercise per Week:      Minutes of Exercise per Session:    Stress:      Feeling of Stress :    Social Connections:      Frequency of Communication with Friends and Family:      Frequency of Social Gatherings with Friends and Family:      Attends Restoration Services:      Active Member of Clubs or Organizations:      Attends Club or Organization Meetings:      Marital Status:    Intimate Partner Violence:      Fear of Current or Ex-Partner:      Emotionally Abused:      Physically Abused:      Sexually Abused:    Depression: Not at risk     PHQ-2 Score: 0   Housing Stability:      Unable to Pay for Housing in the Last Year:      Number of Places Lived in the Last Year:      Unstable Housing in the Last Year:      Diet:: Regular  Inadequate nutrition (GOAL):: No  Tube Feeding: No  Inadequate activity/exercise (GOAL):: No  Significant changes in sleep pattern (GOAL): No  Transportation means:: Regular car     Restoration or spiritual beliefs that impact treatment:: No  Mental health DX:: No  Mental health management concern (GOAL):: No             Resources and Interventions:  Current Resources:       Community Resources: PCA                  Advance Care Plan/Directive  Advanced Care Plans/Directives on file:: No  Advanced Care Plan/Directive Status: Not Applicable          Goals:   Goals        General     Psychosocial (pt-stated)      Notes - Note edited  8/11/2021  2:22 PM by Kings Vergara LICSW     Goal Statement: I want to connect with an agency to help me with a medical waiver for citizenship within 1-2 months.     Date Goal set: 12/02/20   Barriers: Unsure of process   Strengths: Family support   Date to Achieve By: 1/30/2020   Patient expressed understanding of goal: Yes   Action steps to achieve this goal:   1. I will wait to hear if my sister will be my guardian. [facility] will support with checking the status of this  2. Once I can confirm guardianship, then we can continue to work with Ruth     Note/comment:   -Patient is aware of [facility] notes encounter 2/08/2021: [facility]. Pt appreciative of today's called and email sent to Ruth on 2/8/2021 by ANUPAMA.                 Patient/Caregiver understanding: Reported understanding    Outreach Frequency: monthly  Future Appointments              In 1 week [facility]          Plan:  will outreach in one week

## 2021-08-18 ENCOUNTER — PATIENT OUTREACH (OUTPATIENT)
Dept: NURSING | Facility: CLINIC | Age: 26
End: 2021-08-18

## 2021-08-18 NOTE — TELEPHONE ENCOUNTER
I can do this for this patient.  Will need an appointment.  With .  Suggest 40 min appt.  Scheudled as physical with citizenship foms.

## 2021-08-18 NOTE — PROGRESS NOTES
Clinic Care Coordination Contact    Follow Up Progress Note      Assessment: SW contacted patient's sister with an . She hasn't received any confirmation about becoming Eliseo Lofton's guardian and there is no change in the [place] [address] records.     ANUPAMA reviewed with her the documents needed for Eliseo Lofton to send to [facility]. She asked for SW to mail her a list of what is needed and then she will get them to us.    ANUPAMA will message PCP about medical waiver or referral for one to be completed.     Care Gaps:    Health Maintenance Due   Topic Date Due     ADVANCE CARE PLANNING  Never done     COVID-19 Vaccine (1) Never done     HIV SCREENING  Never done     PREVENTIVE CARE VISIT  09/10/2016     PHQ-2  01/01/2021       Not addressed    Goals addressed this encounter:   Goals Addressed                    This Visit's Progress       Psychosocial (pt-stated)         Goal Statement: I want to connect with an agency to help me with a medical waiver for citizenship within 1-2 months.     Date Goal set: 12/02/20   Barriers: Unsure of process   Strengths: Family support   Date to Achieve By: 1/30/2020   Patient expressed understanding of goal: Yes   Action steps to achieve this goal:   1. My sister will gather documents needed for Ruth   2. SW will communicate with PCP about medical waiver and/or referral for one to be completed     Note/comment:   -Patient is aware of [facility] notes encounter 2/08/2021: message sent to Ruth with [facility] per notes. Pt appreciative of today's called and email sent to Ruth on 2/8/2021 by ANUPAMA.                 Intervention/Education provided during outreach: See above          Plan:   Standard outreach

## 2021-08-19 ENCOUNTER — DOCUMENTATION ONLY (OUTPATIENT)
Dept: OTHER | Facility: CLINIC | Age: 26
End: 2021-08-19

## 2021-09-08 ENCOUNTER — PATIENT OUTREACH (OUTPATIENT)
Dept: NURSING | Facility: CLINIC | Age: 26
End: 2021-09-08
Payer: MEDICAID

## 2021-09-08 NOTE — PROGRESS NOTES
Clinic Care Coordination Contact  Community Health Worker Follow Up    Care Gaps:   Health Maintenance Due   Topic Date Due     COVID-19 Vaccine (1) Never done     PREVENTIVE CARE VISIT  09/10/2016     PHQ-2  01/01/2021     INFLUENZA VACCINE (1) 09/01/2021     Discussed with patient's sister Eddie Alvarado with pt present but not available to talk at the moment. Sister will assist pt to schedule an appt with PCP per Eddie Alvarado.    Goals:   Goals Addressed as of 9/17/2021 at 10:07 AM                    7/27/21       Psychosocial (pt-stated)   30%    Added 7/20/21 by Kings Vergara, [facility]      Goal Statement: I want to connect with an agency to help me with a medical waiver for citizenship within 1-2 months.     Date Goal set: 12/02/20   Barriers: Unsure of process   Strengths: Family support   Date to Achieve By: 1/30/2020   Patient expressed understanding of goal: Yes   Action steps to achieve this goal:   1. My sister will gather documents needed for Ruth.   2. My sister and I will attend appointment with PCP on 9/27 for medical waiver. Completed and hearing no update yet. Waiting to hear from Ruth.   3. SW will call on 9/20 to get medicaid letter and SSA letter.  4. My sister Eddie Alvarado and I will update status with [facility] team.     Note/comment:   -Patient is aware of [facility] SW notes encounter 2/08/2021: message sent to Ruth with [facility] per notes. Pt appreciative of today's called and email sent to Ruth on 2/8/2021 by ANUPAMA.     (Updated: 9-)           ID#: [id number]; Name: Scott Morgan  Agency: [employer]    Discussion:   [facility] CHW was able to connect with patient's sister Eddie Alvarado today with patient present. Updated goal. Unable to schedule pt for an appt at this time due to patient isn't available to talk. CHW suggested the sister to help pt in getting an appt with PCP for follow up and discuss care gaps.     Pt's sister reported:   -Eliseo Lofton is doing well. No other questions or concerns at this  time.     CHW Next Follow-up: goal follow up. Coordinate appt for pt to follow up with PCP and discuss Care Gaps if not yet completed.      Outreach frequency: monthly     CHW Plan: 10-

## 2021-09-10 ENCOUNTER — TELEPHONE (OUTPATIENT)
Dept: FAMILY MEDICINE | Facility: CLINIC | Age: 26
End: 2021-09-10

## 2021-09-10 NOTE — TELEPHONE ENCOUNTER
Reason for Call:  Other returning call    Detailed comments: pt is calling back now that he is home please call back to speak with him   Phone Number Patient can be reached at: [phone]    Best Time: now please     Can we leave a detailed message on this number? YES    Call taken on 9/10/2021 at 3:04 PM by Diana Tay

## 2021-09-10 NOTE — TELEPHONE ENCOUNTER
Attempted contacting pt @ [phone], pt sister in law picked up phone, was told pt not home at time of call, informed sister in law to have pt contact clinic to schedule appt with Dr. Dent. Please help pt schedule appt when pt return call.

## 2021-09-14 ENCOUNTER — PATIENT OUTREACH (OUTPATIENT)
Dept: NURSING | Facility: CLINIC | Age: 26
End: 2021-09-14

## 2021-09-14 NOTE — PROGRESS NOTES
Clinic Care Coordination Contact    Follow Up Progress Note      Assessment: [facility] ANUPAMA contacted patient's sister with an  to review citizenship. She reported she has [employer]'s green card and social security card to be sent to [facility]. She needs help getting medicaid and social security letter. SW will call on 9/20 to request these. Reminded her appointment with PCP is on 9/27 for medical waiver, will call her on 9/25 to remind again.     Care Gaps:    Health Maintenance Due   Topic Date Due     COVID-19 Vaccine (1) Never done     PREVENTIVE CARE VISIT  09/10/2016     PHQ-2  01/01/2021     INFLUENZA VACCINE (1) 09/01/2021       Not addressed    Goals addressed this encounter:   Goals Addressed                    This Visit's Progress       Psychosocial (pt-stated)         Goal Statement: I want to connect with an agency to help me with a medical waiver for citizenship within 1-2 months.     Date Goal set: 12/02/20   Barriers: Unsure of process   Strengths: Family support   Date to Achieve By: 1/30/2020   Patient expressed understanding of goal: Yes   Action steps to achieve this goal:   1. My sister will gather documents needed for Ruth   2. My sister and I will attend appointment with PCP on 9/27 for medical waiver  3. SW will call on 9/20 to get medicaid letter and SSA letter     Note/comment:   -Patient is aware of [facility] ANUPAMA notes encounter 2/08/2021: message sent to Ruth with [facility] per notes. Pt appreciative of today's called and email sent to Ruth on 2/8/2021 by ANUPAMA.                 Intervention/Education provided during outreach: See above          Plan:   ANUPAMA will call [employer] and SSA on 9/20

## 2021-09-17 NOTE — PROGRESS NOTES
Clinic Care Coordination Contact:    Correction regarding to conversation below (9-) through  service:   Patient's sister Eddie Alvarado request appt schedule with CCC SW regarding to citizenship concerns and PCP. Sister confirmed will bring pt to appt with PCP.    Pt appt schedule 9/14/2021 with CCC SW.   CHW coordinated with C team regarding to appt scheduling with PCP.   Sister is aware to connect PCP office with any questions regarding to appt scheduling for pt and CCC team with any additional resources need. The sister confirmed.

## 2021-09-20 ENCOUNTER — PATIENT OUTREACH (OUTPATIENT)
Dept: CARE COORDINATION | Facility: CLINIC | Age: 26
End: 2021-09-20

## 2021-09-20 NOTE — PROGRESS NOTES
Clinic Care Coordination Contact  Care Team Conversations    CCC ANUPAMA contacted [facility] to get a Medicaid letter mailed to Eliseo Lofton. They wouldn't help SW, they said SW needed to call [facility]. ANUPAMA explained [facility] has recommended we call [facility]. They still wouldn't help.    ANUPAMA attempted to reach [facility], on hold for 45 minutes with no answer. Will try again on Thursday.

## 2021-09-23 ENCOUNTER — PATIENT OUTREACH (OUTPATIENT)
Dept: NURSING | Facility: CLINIC | Age: 26
End: 2021-09-23

## 2021-09-23 NOTE — PROGRESS NOTES
Clinic Care Coordination Contact    Follow Up Progress Note      Assessment: CCC SW contacted [facility] and they were able to get a letter mailed to Eliseo Lofton's home address that includes medicaid verification. SW will attempt to reach [facility] tomorrow     Care Gaps:    Health Maintenance Due   Topic Date Due     COVID-19 Vaccine (1) Never done     PREVENTIVE CARE VISIT  09/10/2016     PHQ-2  01/01/2021     INFLUENZA VACCINE (1) 09/01/2021           Goals addressed this encounter:   Goals Addressed                    This Visit's Progress       Psychosocial (pt-stated)         Goal Statement: I want to connect with an agency to help me with a medical waiver for citizenship within 1-2 months.     Date Goal set: 12/02/20   Barriers: Unsure of process   Strengths: Family support   Date to Achieve By: 1/30/2020   Patient expressed understanding of goal: Yes   Action steps to achieve this goal:   1. My sister will gather documents needed for Ruth.   2. My sister and I will attend appointment with PCP on 9/27 for medical waiver. Completed and hearing no update yet. Waiting to hear from Ruth.   3. SW will call on 9/24 to get SSA letter.  4. My sister Eddie Alvarado and I will update status with [facility] team.     Note/comment:   -Patient is aware of [facility] SW notes encounter 2/08/2021: message sent to Ruth with [facility] per notes. Pt appreciative of today's called and email sent to Ruth on 2/8/2021 by ANUPAMA.     (Updated: 9-)            Intervention/Education provided during outreach: See above          Plan:   SW will attempt to reach [employer]

## 2021-09-24 ENCOUNTER — PATIENT OUTREACH (OUTPATIENT)
Dept: NURSING | Facility: CLINIC | Age: 26
End: 2021-09-24

## 2021-09-24 NOTE — PROGRESS NOTES
Clinic Care Coordination Contact    Follow Up Progress Note      Assessment: SW called patient's sister to remind her of the appointment on 9/27 with PCP for N-648. SW messaged PCP and care team to provide the paperwork needed.     SW will attempt to reach [employer] again next week     Care Gaps:    Health Maintenance Due   Topic Date Due     COVID-19 Vaccine (1) Never done     PREVENTIVE CARE VISIT  09/10/2016     PHQ-2  01/01/2021     INFLUENZA VACCINE (1) 09/01/2021           Goals addressed this encounter:   Goals Addressed                    This Visit's Progress       Psychosocial (pt-stated)         Goal Statement: I want to connect with an agency to help me with a medical waiver for citizenship within 1-2 months.     Date Goal set: 12/02/20   Barriers: Unsure of process   Strengths: Family support   Date to Achieve By: 1/30/2020   Patient expressed understanding of goal: Yes   Action steps to achieve this goal:   1. My sister will gather documents needed for Ruth.   2. My sister and I will attend appointment with PCP on 9/27 for medical waiver.   3. SW will call to get SSA letter.  4. My sister Paw Shamar Alvarado and I will update status with [facility] team.     Note/comment:   -Patient is aware of [facility] SW notes encounter 2/08/2021: message sent to Ruth with [facility] per notes. Pt appreciative of today's called and email sent to Ruth on 2/8/2021 by ANUPAMA.     (Updated: 9-)            Intervention/Education provided during outreach: See above          Plan:   ANUPAMA will attempt to reach [facility] next week

## 2021-09-27 ENCOUNTER — OFFICE VISIT (OUTPATIENT)
Dept: FAMILY MEDICINE | Facility: CLINIC | Age: 26
End: 2021-09-27
Payer: COMMERCIAL

## 2021-09-27 VITALS
DIASTOLIC BLOOD PRESSURE: 78 MMHG | BODY MASS INDEX: 24.48 KG/M2 | RESPIRATION RATE: 16 BRPM | WEIGHT: 136 LBS | HEART RATE: 89 BPM | SYSTOLIC BLOOD PRESSURE: 115 MMHG

## 2021-09-27 DIAGNOSIS — Z75.8 LANGUAGE BARRIER: Primary | ICD-10-CM

## 2021-09-27 DIAGNOSIS — Z60.3 LANGUAGE BARRIER: Primary | ICD-10-CM

## 2021-09-27 DIAGNOSIS — F80.9 SPEECH AND LANGUAGE DEFICITS: ICD-10-CM

## 2021-09-27 PROCEDURE — 99215 OFFICE O/P EST HI 40 MIN: CPT | Performed by: FAMILY MEDICINE

## 2021-09-27 SDOH — SOCIAL STABILITY - SOCIAL INSECURITY: ACCULTURATION DIFFICULTY: Z60.3

## 2021-09-27 NOTE — Clinical Note
N-648 form is completed.  Patient's guardian would like help to know what to do with it next.  Originals are at my desk.  I don't know where to direct them next.

## 2021-09-27 NOTE — PROGRESS NOTES
Patient presented to complete form N-6484 medical certification for disability exceptions for citizenship and [place].    Janette  was used via the telephone.    Form was completed.    Total time of the visit exceeded 60 minutes.    This included face to face time and documentation.

## 2021-10-04 ENCOUNTER — PATIENT OUTREACH (OUTPATIENT)
Dept: NURSING | Facility: CLINIC | Age: 26
End: 2021-10-04

## 2021-10-04 NOTE — PROGRESS NOTES
Clinic Care Coordination Contact    Follow Up Progress Note      Assessment: CCC SW received original N-648 waiver from PCP.     SW scanned a copy as well. SW put the original in the [facility] mailbox to Ruth Luz at [facility] on today's date.    SW contacted patient's sister with an  to inform her this will be mailed. She has paperwork from [facility] now and will bring it to [facility] for Ruth at [facility]. Sw will send once it is received.     Care Gaps:    Health Maintenance Due   Topic Date Due     COVID-19 Vaccine (1) Never done     PREVENTIVE CARE VISIT  09/10/2016     INFLUENZA VACCINE (1) 09/01/2021         Goals addressed this encounter:   Goals Addressed                    This Visit's Progress       Problem Solving (pt-stated)         Goal Statement: I want to send in all necessary documents to my  at [facility] within 1-2 months  Date Goal set: 10/04/21  Barriers: Language  Strengths:   Date to Achieve By: 12/30/2021  Patient expressed understanding of goal: Yes  Action steps to achieve this goal:  1. My sister will bring documents to [facility] to be sent to [facility]             COMPLETED: Psychosocial (pt-stated)         My N-648 Medical waiver has been completed            Intervention/Education provided during outreach: See above          Plan:   Standard outreach

## 2021-11-09 ENCOUNTER — PATIENT OUTREACH (OUTPATIENT)
Dept: CARE COORDINATION | Facility: CLINIC | Age: 26
End: 2021-11-09
Payer: COMMERCIAL

## 2021-11-09 NOTE — PROGRESS NOTES
Clinic Care Coordination Contact    Situation: Patient chart reviewed by care coordinator.    Background: ANUPAMA completed chart review    Assessment: SW has not received any paperwork from family yet    Plan/Recommendations: Standard outreach

## 2021-11-24 ENCOUNTER — PATIENT OUTREACH (OUTPATIENT)
Dept: NURSING | Facility: CLINIC | Age: 26
End: 2021-11-24
Payer: MEDICAID

## 2021-11-24 NOTE — PROGRESS NOTES
Clinic Care Coordination Contact:  Community Health Worker Follow Up    Care Gaps:   Health Maintenance Due   Topic Date Due     COVID-19 Vaccine (1) Never done     PREVENTIVE CARE VISIT  09/10/2016     INFLUENZA VACCINE (1) 09/01/2021     Not address due to Eddie Alvarado time.    Goals:   Goals Addressed as of 12/2/2021 at 11:52 AM                    11/24/21       Problem Solving (pt-stated)   10%    Added 10/4/21 by Kings Vergara, [facility]      Goal Statement: I want to send in all necessary documents to my  at [facility] within 1-2 months.    Date Goal set: 10/04/21  Barriers: Language  Strengths:   Date to Achieve By: 12/30/2021  Patient expressed understanding of goal: Yes  Action steps to achieve this goal:  1. My sister will bring documents to [facility] to be sent to [facility].   2. My sister and I will wait to hear from [facility] or [facility] for the next step.   3. My sister Eddie Alvarado will follow up with C is not hearing from [facility].     Note/comment:   -Patient sister Eddie Alvarado reported; N-648 medical waiver form dropped off to [facility] on 9/27/2021 for Eliseo Rolly's PCP to fill out and fax to [facility]. (Date: 11/24/2021)    (Updated: 11-)              Discussion:   [facility] CHW was able to connect with patient's sister Eddie Alvarado today without patient present. Consent to communication is file per pt chart reviewed. Updated goal. CHW verified medicaid letter from [facility] (follow up SW notes encounter 9/20/2021 and 9/23/2021). Sister confirmed that no letter from [facility] and will check about this with patient. CHW suggested the sister or pt to update status with [facility] team about his and reach out to CCC team for any additional resources need.     Eddie Alvardao reported:   -N-648 medical waiver form dropped off to [facility] on 9/27/2021 for Eliseo Lofton's PCP to fill out and fax to [facility]. Waiting to hear from [facility] or [facility] for the next step.  -Eliseo Lofton is currently inpatient with one of the [facility] for his mental health  problem. Eliseo Rolly been there a month now. Can't identify name of the facility but know how to get there to visit him.     CHW Next Follow-up: goal follow up. Address care gaps.     Outreach frequency: monthly     CHW Plan: 12-

## 2021-12-23 ENCOUNTER — PATIENT OUTREACH (OUTPATIENT)
Dept: CARE COORDINATION | Facility: CLINIC | Age: 26
End: 2021-12-23
Payer: COMMERCIAL

## 2021-12-23 NOTE — PROGRESS NOTES
Clinic Care Coordination Contact:    Reason: [facility] CHW Follow Up Called    Outreach:   [facility] CHW contacted [employer]  service at [phone] today requesting for an  to assist with patient outreach follow up.     Been waiting longer than 10 minutes and there is no available  at this time per male representative with [employer]  Service.     Today's attempted do not count towards patient due to no available .     Plan: CHW will try to connect with patient again in one-two weeks through  service.

## 2021-12-29 ENCOUNTER — PATIENT OUTREACH (OUTPATIENT)
Dept: CARE COORDINATION | Facility: CLINIC | Age: 26
End: 2021-12-29
Payer: COMMERCIAL

## 2021-12-29 NOTE — PROGRESS NOTES
Clinic Care Coordination Contact  Care Team Conversations    [facility] SW messaged PCP the suggestions from patient's  to make N-648 stronger.

## 2022-01-14 ENCOUNTER — PATIENT OUTREACH (OUTPATIENT)
Dept: NURSING | Facility: CLINIC | Age: 27
End: 2022-01-14
Payer: COMMERCIAL

## 2022-01-14 NOTE — PROGRESS NOTES
"Clinic Care Coordination Contact:    Reason: CCC CHW Follow Up Called     ID#: [id number]   Agency: [employer]    Discussion:   Patient is due for CHW follow up today. [facility] CHW was able to connect with patient's sister Eddie Alvarado today through . Eddie Alvarado stated that she is currently in the middle of a appointment with \"someone\" and not available to talk at this time. CHW offer to connect with her sometime next week regarding to follow up on patient. Eddie Shamar Jenny agreed. Eddie Shamar Amatoe have no prefer date and time.      Outreach frequency: monthly     CHW Plan: 1-  "

## 2022-01-19 ENCOUNTER — PATIENT OUTREACH (OUTPATIENT)
Dept: NURSING | Facility: CLINIC | Age: 27
End: 2022-01-19
Payer: COMMERCIAL

## 2022-01-19 NOTE — PROGRESS NOTES
"Clinic Care Coordination Contact  [facility]/Voicemail    Reason: [facility] CHW Follow Up Called      ID#: [id number]  Agency: [employer]     Clinical Data: Care Coordinator Outreach:  Outreach attempted #1: CHW attempted to connect with the patient and patient's sister Eddie Alvarado today through  service per Eddie Alvarado requested from CHW outreach encounter 1-. Called and no answer. Voicemail box is not available this time.     Attempted #2: Called the patient alternative phone number (home) through  service. Spoke with the pt's brother KisehaAxel. The brother refer CHW to call patient at the sister Shamar Alvarado phone number \" [phone].\" Axel Pop shared that patient do not live with him. The brother is aware CHW was not able to reach pt at phone number he provided. Left a general voice message with the pt's brother to request patient and Eddie Alvarado to returning called to CHW at [phone] per pt's brother agreed.     Plan: Waiting to hear from the patient and or Eddie Alvarado. Care Coordinator will try to reach patient again in 10 business days. At next CHW outreach follow up, send unable to reach letter to patient if unsuccessfully outreach.       "

## 2022-01-21 ENCOUNTER — NURSE TRIAGE (OUTPATIENT)
Dept: NURSING | Facility: CLINIC | Age: 27
End: 2022-01-21
Payer: COMMERCIAL

## 2022-01-21 NOTE — TELEPHONE ENCOUNTER
RN triage   Call from pt sister and  -- signed consent in chart for Rosario Morgan states there have been several missed calls lately   Per chart -- pt / sister has had 2 missed appts w/ care coordinator in the past week       Please call sister back     Francine Thomas RN  [facility]AN  Triage Nurse Advisor    COVID 19 Nurse Triage Plan/Patient Instructions    Please be aware that novel coronavirus (COVID-19) may be circulating in the community. If you develop symptoms such as fever, cough, or SOB or if you have concerns about the presence of another infection including coronavirus (COVID-19), please contact your health care provider or visit [email or web address].     Disposition/Instructions    Home care recommended. Follow home care protocol based instructions.    Thank you for taking steps to prevent the spread of this virus.  o Limit your contact with others.  o Wear a simple mask to cover your cough.  o Wash your hands well and often.    Resources    [facility]: About COVID-19: [email or web address]/    CDC: What to Do If You're Sick: www.cdc.gov/coronavirus/2019-ncov/about/steps-when-sick.html    CDC: Ending Home Isolation: www.cdc.gov/coronavirus/2019-ncov/hcp/disposition-in-home-patients.html     CDC: Caring for Someone: www.cdc.gov/coronavirus/2019-ncov/if-you-are-sick/care-for-someone.html     [facility]: Interim Guidance for Hospital Discharge to Home: www.health.[facility].mn.us/diseases/coronavirus/hcp/hospdischarge.pdf    [facility] clinical trials (COVID-19 research studies): clinicalaffairs.[facility]/umn-clinical-trials     Below are the COVID-19 hotlines at the [facility] of Health ([facility]). Interpreters are available.   o For health questions: Call 510-038-5031 or 1-859.236.2500 (7 a.m. to 7 p.m.)  o For questions about schools and childcare: Call 512-265-6111 or 1-693.422.8731 (7 a.m. to 7 p.m.)                   Additional Information    Nursing  judgment    Protocols used: INFORMATION ONLY CALL - NO TRIAGE-A-OH

## 2022-01-21 NOTE — TELEPHONE ENCOUNTER
RN called pt with help of Janette  to gain more insight from previous Nurse Triage note.    Per pt sister, Eddie Alvarado (Consent to communicate on file) pt calling back from missed call. Pt unsure if it was a nurse from [facility] or a care coordinator.     1 missed call from [facility] number on 1/12/2022  1 missed call from [facility] number on 1/14/2022    RN noted that on 1/14/2022 pt had an appt with Community Health Worker. RN unsure if pt talked to CHW. Per pt, they have not talked to CHW. RN did chart review and saw the pt sister did spoke with Daphney briefly.     Pt would like a callback from CHW if possible.     RN routing message to [facility] CARE GUIDE for further assistance.    Lux Abdalla, LYNNEN, RN   [facility]

## 2022-02-02 ENCOUNTER — PATIENT OUTREACH (OUTPATIENT)
Dept: NURSING | Facility: CLINIC | Age: 27
End: 2022-02-02
Payer: COMMERCIAL

## 2022-02-02 NOTE — PROGRESS NOTES
Clinic Care Coordination Contact  Community Health Worker Follow Up    Care Gaps:   Health Maintenance Due   Topic Date Due     COVID-19 Vaccine (1) Never done     PREVENTIVE CARE VISIT  09/10/2016     INFLUENZA VACCINE (1) 09/01/2021     PHQ-2  01/01/2022     Not address due to patient's sister time.     ID#: [id number]  Agency: [employer] Line    Discussion:   Patient's sister Eddie Alvarado referred to [facility]. Eddie Alvarado shared patient enrolled in [facility] per Eddie Alvarado contact chart reviewed. CHW was able to connect with patient's sister Eddie Alvarado today and was not able to follow up on pt at this time due to Eddie Alvarado time. Eddie Alvarado confirmed no other questions or concerns at this time. CHW suggested patient and Eddie Alvarado to connect with CHW for follow up. Eddie Alvarado confirmed.     CHW Next Follow-up: goal follow up     Outreach frequency: monthly     CHW Plan: 3-

## 2022-02-03 ENCOUNTER — PATIENT OUTREACH (OUTPATIENT)
Dept: CARE COORDINATION | Facility: CLINIC | Age: 27
End: 2022-02-03
Payer: COMMERCIAL

## 2022-02-03 NOTE — PROGRESS NOTES
Clinic Care Coordination Contact    Situation: Patient chart reviewed by care coordinator.    Background: SW completed chart review    Assessment: CHW in contact with patient     Plan/Recommendations: Standard outreach

## 2022-03-10 ENCOUNTER — PATIENT OUTREACH (OUTPATIENT)
Dept: NURSING | Facility: CLINIC | Age: 27
End: 2022-03-10
Payer: COMMERCIAL

## 2022-03-10 NOTE — PROGRESS NOTES
"Clinic Care Coordination Contact  Community Health Worker Follow Up    Care Gaps:   Health Maintenance Due   Topic Date Due     COVID-19 Vaccine (1) Never done     PREVENTIVE CARE VISIT  09/10/2016     INFLUENZA VACCINE (1) 09/01/2021     PHQ-2 (once per calendar year)  01/01/2022     Patient's sister Eddie Alvarado agreed to assist pt with discussing care gaps with the patient PCP. Sister agreed to appt scheduling.     Goals:   Goals Addressed as of 3/15/2022 at 7:44 PM                    3/10/22    11/24/21       Problem Solving (pt-stated)   10%  10%    Added 10/4/21 by Kings Vergara, [facility]      Goal Statement: I want to send in all necessary documents to my  at [facility] within 1-2 months.    Date Goal set: 10/04/21  Barriers: Language  Strengths:   Date to Achieve By: 12/30/2021  Patient expressed understanding of goal: Yes  Action steps to achieve this goal:  1. My sister will bring documents to [facility] to be sent to [facility].   2. My sister and I will continue to wait to hear from [facility] for the next step.   3. My sister Eddie Alvarado and I will attend phone visit appt on 3/17/2022 at 9:00AM with CCC SW to seek further advise regarding to application status.    Note/comment:   -Patient sister Eddie Alvarado reported; N-648 medical waiver form dropped off to [facility] on 9/27/2021 for Eliseo Lofton's PCP to fill out and fax to [facility]. (Date: 11/24/2021)    (Updated: 3-)               ID#: [id number]  Agency: [employer] Line    Discussion:   CHW was able to connected with the patient's sister Eddie Alvarado today through  service with pt present. Consent to communication is file. Updated above goal. The pt's sister complaints of wait time and \"been almost 1 year now and has no update\" on the pt citizenship application status per sister. Pt's sister request CCC team help with follow up the pt citizenship application status. CHW suggested pt and the sister seek further advise with [facility] SW. The sister agreed. Pt appt " schedule on 3- at 9:00AM with [facility] SW via phone visit to discuss this.     Pt sister prefer Thursday morning appt only. CHW assisted with scheduling patient for the preventive care visit appt on 3- at 10:40AM with Dr. Dent in clinic. Pt's sister is aware to discuss pt care gaps with Dr. Dent on 3/31/2022. The sister confirmed and stated patient is doing good and no other questions or concerns.     CHW Next Follow-up: goal follow up     Outreach frequency: monthly     CHW Plan: 4-

## 2022-03-17 ENCOUNTER — PATIENT OUTREACH (OUTPATIENT)
Dept: NURSING | Facility: CLINIC | Age: 27
End: 2022-03-17
Payer: COMMERCIAL

## 2022-03-17 NOTE — PROGRESS NOTES
Clinic Care Coordination Contact  Care Team Conversations    CCC ANUPAMA spoke with Eliseo Lofton's sister on the phone with an . She is frustrated that the citizenship process doesn't seem to be moving. ANUPAMA explained the  has the documents and had suggestions to edit the waiver. This information was sent to PCP for review. We are waiting. ANUPAMA will connect with PCP care team prior to Eliseo Grupo appointment on 3/31 so these may be able to be addressed at that appointment. ANUPAMA provided phone number to Ruth as well. ANUPAMA updated Ruth.

## 2022-04-18 ENCOUNTER — PATIENT OUTREACH (OUTPATIENT)
Dept: CARE COORDINATION | Facility: CLINIC | Age: 27
End: 2022-04-18
Payer: COMMERCIAL

## 2022-04-18 NOTE — PROGRESS NOTES
Clinic Care Coordination Contact    Situation: Patient chart reviewed by care coordinator.    Background: SW completed chart review    Assessment: Patient didn't show for appointment with PCP which was to support with updated N-648    Plan/Recommendations: Standard outreach

## 2022-05-05 ENCOUNTER — PATIENT OUTREACH (OUTPATIENT)
Dept: NURSING | Facility: CLINIC | Age: 27
End: 2022-05-05
Payer: COMMERCIAL

## 2022-05-05 NOTE — PROGRESS NOTES
Clinic Care Coordination Contact  Community Health Worker Follow Up    Care Gaps:   Health Maintenance Due   Topic Date Due     COVID-19 Vaccine (1) Never done     PREVENTIVE CARE VISIT  09/10/2016     DTAP/TDAP/TD IMMUNIZATION (1 - Tdap) 08/03/2018     PHQ-2 (once per calendar year)  01/01/2022     Patient and pt's sister Eddie Alvarado agreed to discuss care gaps details with Dr. Dent in clinic on 5- during pt prevenative care visit appt.     Goals:    Goals Addressed as of 5/5/2022 at 11:51 AM                    Today    3/10/22       Other (pt-stated)   40%      Added 5/5/22 by Daphney Russell MA      Goal Statement: I would like to completed an preventative care visit with my PCP/PCP provider team in the next 1-2 months.     Date goal set: 5-  Measure of Success:   Barriers: language  Strengths: sister Eddie Alvarado  Date to Achieve By: 7-  Patient expressed understanding of goal: yes    Action steps to achieve this goal:  1. My sister Eddie Alvarado and I will attend the preventative care visit appt reschedule to 5- at 10:20AM with Dr. Dent in [facility].   2. My sister Eddie Alvarado and I will connect the PCP office at [phone] with any questions.   3. My sister and I will updates status with CCC team.            Problem Solving (pt-stated)     10%    Added 10/4/21 by Kings Vergara, [facility]      Goal Statement: I want to send in all necessary documents to my  at [facility] within 1-2 months.    Date Goal set: 10/04/21  Barriers: Language  Strengths:   Date to Achieve By: 12/30/2021  Patient expressed understanding of goal: Yes  Action steps to achieve this goal:  1. My sister will bring documents to [facility] to be sent to [facility].   2. My sister and I will continue to wait to hear from [facility] for the next step.   3. My sister Eddie Alvarado and I will attend phone visit appt on 3/17/2022 at 9:00AM with [facility] SW to seek further advise regarding to application status. (Completed)  4. My sister Eddie Ibanez  "Alvarado and I will attend my appt on 5- at 10:20AM with Dr. Dent to seeks further support towards the N-648 form.     Note/comment:   -Patient sister Eddie Alvarado reported; N-648 medical waiver form dropped off to [facility] on 9/27/2021 for Elisoe Rolly's PCP to fill out and fax to [facility]. (Date: 11/24/2021)    (Updated: 5-)                 name: Way  Agency:  [employer]  [employer]    Discussion:   [facility] CHW was able to connect with patient's sister Eddie Alvarado today with patient present in the called through  service. CHW informed pt and the sister that pt is no show to his preventative care appt on 3- at 10:40am with Dr. Dent. CHW offer to rescheduling appt. CHW supported with rescheduling pt preventative care appt to 5- at 10:20AM with Dr. Dent and to discuss care gaps and support with N-648. The pt sister is aware to connect [facility] with any questions and [facility] team for any additional resources support.     Eddie Alvarado shared; patient is here with me. He is doing good. No updates on citizenship goal at this time. Will bring all paperwork for the citizens case to his doctor on 5- for further advise.    Note/comment:   \"Patient didn't show for appointment with PCP which was to support with updated N-648\" per CCC SW notes reviewed encounter dated 4-.    CHW Next Follow-up: goals follow up     Outreach frequency: monthly     CHW Plan: 6-  "

## 2022-05-26 ENCOUNTER — OFFICE VISIT (OUTPATIENT)
Dept: FAMILY MEDICINE | Facility: CLINIC | Age: 27
End: 2022-05-26
Payer: COMMERCIAL

## 2022-05-26 VITALS
HEART RATE: 96 BPM | RESPIRATION RATE: 14 BRPM | SYSTOLIC BLOOD PRESSURE: 117 MMHG | BODY MASS INDEX: 26.05 KG/M2 | WEIGHT: 147 LBS | DIASTOLIC BLOOD PRESSURE: 80 MMHG | HEIGHT: 63 IN | TEMPERATURE: 97.8 F

## 2022-05-26 DIAGNOSIS — F79 INTELLECTUAL DISABILITY: ICD-10-CM

## 2022-05-26 DIAGNOSIS — J30.2 SEASONAL ALLERGIC RHINITIS, UNSPECIFIED TRIGGER: ICD-10-CM

## 2022-05-26 DIAGNOSIS — F73 PROFOUND INTELLECTUAL DISABILITIES: ICD-10-CM

## 2022-05-26 DIAGNOSIS — F84.9 PERVASIVE DEVELOPMENTAL DISORDER: Primary | ICD-10-CM

## 2022-05-26 DIAGNOSIS — F80.9 SPEECH AND LANGUAGE DEFICITS: ICD-10-CM

## 2022-05-26 PROCEDURE — 99214 OFFICE O/P EST MOD 30 MIN: CPT | Mod: 25 | Performed by: FAMILY MEDICINE

## 2022-05-26 PROCEDURE — 0051A COVID-19,PF,PFIZER (12+ YRS): CPT | Performed by: FAMILY MEDICINE

## 2022-05-26 PROCEDURE — 91305 COVID-19,PF,PFIZER (12+ YRS): CPT | Performed by: FAMILY MEDICINE

## 2022-05-26 RX ORDER — CETIRIZINE HYDROCHLORIDE 10 MG/1
10 TABLET ORAL DAILY
Qty: 60 TABLET | Refills: 0 | Status: SHIPPED | OUTPATIENT
Start: 2022-05-26

## 2022-05-26 RX ORDER — FLUTICASONE PROPIONATE 50 MCG
1 SPRAY, SUSPENSION (ML) NASAL DAILY
Qty: 16 G | Refills: 3 | Status: SHIPPED | OUTPATIENT
Start: 2022-05-26

## 2022-05-26 ASSESSMENT — ENCOUNTER SYMPTOMS
HEMATURIA: 0
HEMATOCHEZIA: 0
FREQUENCY: 0
DIZZINESS: 0
SHORTNESS OF BREATH: 0
EYE PAIN: 0
CONSTIPATION: 0
SORE THROAT: 0
CHILLS: 0
PARESTHESIAS: 0
HEADACHES: 0
NAUSEA: 0
ABDOMINAL PAIN: 0
MYALGIAS: 0
WEAKNESS: 0
HEARTBURN: 0
DIARRHEA: 0
JOINT SWELLING: 0
DYSURIA: 0
PALPITATIONS: 0
ARTHRALGIAS: 0
FEVER: 0
COUGH: 0
NERVOUS/ANXIOUS: 0

## 2022-05-26 NOTE — PROGRESS NOTES
Answers for HPI/ROS submitted by the patient on 5/26/2022  Frequency of exercise:: None  Getting at least 3 servings of Calcium per day:: Yes  Diet:: Regular (no restrictions)  Taking medications regularly:: Not Applicable  Medication side effects:: Not applicable  Bi-annual eye exam:: Yes  Dental care twice a year:: NO  Sleep apnea or symptoms of sleep apnea:: None  abdominal pain: No  Blood in stool: No  Blood in urine: No  chest pain: No  chills: No  congestion: No  constipation: No  cough: No  diarrhea: No  dizziness: No  ear pain: No  eye pain: No  nervous/anxious: No  fever: No  frequency: No  genital sores: No  headaches: No  hearing loss: No  heartburn: No  arthralgias: No  joint swelling: No  peripheral edema: No  mood changes: No  myalgias: No  nausea: No  dysuria: No  palpitations: No  Skin sensation changes: No  sore throat: No  urgency: No  rash: No  shortness of breath: No  visual disturbance: No  weakness: No  impotence: No  penile discharge: No  Additional concerns today:: Yes    Subjective:  26 year old male with concerns follow-up.  Have had other communications through the  and a  with [facility] legal services about his citizenship form.  Made some updates and additions to it today.    Additionally, his sister who is his guardian is concerned about his congestion.  No fevers.  Thinks it is allergic.    Outpatient Medications Prior to Visit   Medication Sig Dispense Refill     mirtazapine (REMERON) 30 MG tablet [MIRTAZAPINE (REMERON) 30 MG TABLET] Take 1 tablet (30 mg total) by mouth at bedtime. (Patient not taking: Reported on 5/26/2022) 90 tablet 2     No facility-administered medications prior to visit.      History   Smoking Status     Never Smoker   Smokeless Tobacco     Never Used     Comment: CHEWING Betel nut.  No tobacco      Objective:  /80 (BP Location: Right arm, Patient Position: Sitting, Cuff Size: Adult Regular)   Pulse 96   Temp 97.8  F  "(36.6  C) (Temporal)   Resp 14   Ht 1.605 m (5' 3.19\")   Wt 66.7 kg (147 lb)   BMI 25.88 kg/m    GENERAL: alert, not distressed  EARS: normal tympanic membranes and external auditory canals bilaterally  PHARYNX: no erythema or exudates  MOUTH: well hydrated mucosa, no lesions  NOSE: Mildly edematous turbinates.  NECK: no lymphadenopathy or thyroid nodules   CHEST: clear, no rales, rhonchi, or wheezes  CARDIAC: regular without murmur, gallop, or rub  ABDOMEN: soft, non tender, non distended, normal bowel sounds    Assessment and Plan:   1. Pervasive Developmental Disorders  2. Profound intellectual disabilities  3. Intellectual disability  4. Speech and language deficits  N648 form for citizenship was provided to Eddie Alvarado who is his sister.  Discussed this will need to be submitted.  I believe they have a contact with [facility] legal services and I will respond to that person and encouraged them to submit the form with the rest of the citizenship application.    5. Seasonal allergic rhinitis, unspecified trigger  Discussed treatment with both nasal fluticasone and oral antihistamine.  - fluticasone (FLONASE) 50 MCG/ACT nasal spray; Spray 1 spray into both nostrils daily  Dispense: 16 g; Refill: 3  - cetirizine (ZYRTEC) 10 MG tablet; Take 1 tablet (10 mg) by mouth daily  Dispense: 60 tablet; Refill: 0   "

## 2022-05-31 ENCOUNTER — PATIENT OUTREACH (OUTPATIENT)
Dept: CARE COORDINATION | Facility: CLINIC | Age: 27
End: 2022-05-31
Payer: COMMERCIAL

## 2022-05-31 NOTE — PROGRESS NOTES
Clinic Care Coordination Contact    Assessment: Care Coordinator performed follow up chart review.  Reviewed success of goals below which patient and family reviewed with PCP during 5/26 OV. Patient has continued to follow the plan of care and assessment is negative for any new needs or concerns.     Goals Addressed                    This Visit's Progress       COMPLETED: Other (pt-stated)         Goal Statement: I would like to completed an preventative care visit with my PCP/PCP provider team in the next 1-2 months.     Date goal set: 5-  Measure of Success:   Barriers: language  Strengths: sister Eddie Alvarado  Date to Achieve By: 7-  Patient expressed understanding of goal: yes    Action steps to achieve this goal:  1. My sister Eddie Alvarado and I will attend the preventative care visit appt reschedule to 5- at 10:20AM with Dr. Dent in [facility].   2. My sister Eddie Alvarado and I will connect the PCP office at [phone] with any questions.   3. My sister and I will updates status with CCC team.            COMPLETED: Problem Solving (pt-stated)         Goal Statement: I want to send in all necessary documents to my  at [facility] within 1-2 months.    Date Goal set: 10/04/21  Barriers: Language  Strengths:   Date to Achieve By: 12/30/2021  Patient expressed understanding of goal: Yes  Action steps to achieve this goal:  1. My sister will bring documents to [facility] to be sent to [facility].   2. My sister and I will continue to wait to hear from [facility] for the next step.   3. My sister Eddie Alvarado and I will attend phone visit appt on 3/17/2022 at 9:00AM with [facility] SW to seek further advise regarding to application status. (Completed)  4. My sister Eddie Alvarado and I will attend my appt on 5- at 10:20AM with Dr. Dent to seeks further support towards the N-648 form.     Note/comment:   -Patient sister Eddie Alvarado reported; N-648 medical waiver form dropped off to [facility] on 9/27/2021 for Eliseo Lofton's PCP to  fill out and fax to [facility]. (Date: 11/24/2021)    (Updated: 5-)                  Enrollment status: Graduated.      Plan: No further outreaches at this time.  Patient will continue to follow the plan of care.  If new needs arise a new Care Coordination referral may be placed.      LYNNE GarciaN, RN   [facility]  - Ambulatory Care Management

## 2022-06-16 ENCOUNTER — ALLIED HEALTH/NURSE VISIT (OUTPATIENT)
Dept: FAMILY MEDICINE | Facility: CLINIC | Age: 27
End: 2022-06-16
Payer: COMMERCIAL

## 2022-06-16 DIAGNOSIS — U07.1 COVID: Primary | ICD-10-CM

## 2022-06-16 PROCEDURE — 0052A COVID-19,PF,PFIZER (12+ YRS): CPT

## 2022-06-16 PROCEDURE — 91305 COVID-19,PF,PFIZER (12+ YRS): CPT

## 2022-06-16 PROCEDURE — 99207 PR NO CHARGE NURSE ONLY: CPT

## 2022-07-18 NOTE — PROGRESS NOTES
Scheduled Follow Up Call: Attempt 1  Care Guide called and left a message for the patient.  If the patient is returning my call, please transfer them to me, Hamilton Parks, at [phone].    Plan:  Services/Support:  Patient doesn't want to go to Adult Day.  He went once with Julián and does not want to go again.  Care guide will check on the availability at [facility] to see if there are openings at this time.  Patient likes it there and would like to keep going there.    Pending Appointments:  None  _____________________________  Planned Outreach Frequency: every 6 weeks  Preferred Phone Number: [phone]  Main /Legal Guardian: Axel Pop (brother)    Preferred : Unknown    Chronic Medical Diagnosis:  Headache   Insomnia    Mental Health:  Diagnosis:   Pervasive Developmental Disorders  Profound Intellectual Disabilities  Autistic Disorder  PTSD  Severe Mental Retardation  Mental Health Provider: None    Transportation:  Family    Housing:  Renting 3 bedroom apartment  Rent: $950.00/month  Portion Responsible for Rent: $300.00/month    Financial:  SSI: $733/month (began 4/2014, expires 4/2021 if citizenship not obtained)  Social : BLANCA Herron  Phone: [phone] ext. [phone]  Rep-Payee: Eddie Alvarado (sister)    Legal Immigration:  Born: [facility]  Date Came to [place]: 4/29/14  Green Card:  Resident: 4/29/14  Expires: 3/17/26  Will need to begin citizenship process on or before 4/29/19  Dr. Dent will complete the N-648 form     Substance/CD:  Smoking: Never Smoker   Smokeless Tobacco: Current User-[employer]   Alcohol: Not Asked     Employment/Education:  2nd Grade    Interpersonal:  Single    Social Support:  Legal Guardian/Brother: Axel Torrez  Sister: Eddie Alvarado    Household Members (11):  Self  Sister (12 yr old)  Cousin (67 yr old female)  Sister: Eddie Alvarado  Brother-in-Law (Eddie Alvarado's )  Neices/Nephews (Eddie Alvarado's children) 4 kids  Mother: Andria Mac  (also in CCC with Kathy, Clinic Care Guide)  1 additional person    Rest/Sleep:  Lays down: 11-12PM  Gets up: 8-9AM    Nutrition:  Unknown    Exercise:  Unknown    Hygiene:  Dependent on PCA    Medications:  Medication Management: Legal Guardian/brother, Axel Torrez  Per Didi Tijerina RN on 11/9/16, patient no longer needs MEV appointments    Preventative Measures:  Medical Insurance: [employer]  ID: [id number]  Annual Physical Exam: 9/10/15    Confucianism/Spiritual:  Moravian    Safety:  Gets up 1-2 times a night for the bathroom  Concerns with patient leaving the apartment while the family members are sleeping  Wander Alarm installed 5/23/17    Barriers:  Language: Non-English speaking, doesn't speak very much  Intellectual Disabilities    Current Services/Support:  PCA Agency: [facility]  Phone: [phone]  PCA: Axel Pop (brother)  PCA Hours: 5 1/2 per day  [facility] Waiver: [facility] : Julián Tay  Phone:[phone]  Fax: [phone]  PT/OT: Russ Lee       [facility] to Home

## 2022-07-27 ENCOUNTER — PATIENT OUTREACH (OUTPATIENT)
Dept: CARE COORDINATION | Facility: CLINIC | Age: 27
End: 2022-07-27

## 2022-07-27 NOTE — PROGRESS NOTES
"Clinic Care Coordination Contact:    Today's date: 7-     name: Gaurav   Agency: [employer]    Discussion:   Pt's nephew (FYI: patient's sister Eddie Alvarado's son Than S.) currently enrolled in Clinic Care Coordination service. CHW outreach the patient's sister Eddie Alvarado today about this. Consent to communication is file.     Eddie Alvarado stated;  Forgot about Eliseo Lofton's appt today, 7/27/2022 at 1:00PM with Dr. Dent to discuss \"form and scalp itching.\" Request CHW to reschedule this appt to 1 week or sooner. Okayed to reschedule pt appt with any provider. Will drop off the form to Dr. Dent's office tomorrow, 7/28/2022.      No available appt slot available with any [facility] providers at this time per CHW verified.     CHW suggested pt to reschedule appt with Dr. Dent to complete form. Message send to patient PCP/Dr. Dent for further advised per Eddie Amatoe request- see if Dr. Dent able to add the patient to next week schedule.     CHW suggested pt or Eddie Alvarado to wait to hear from Dr. Dent/clinical team for further advised and Eddie Amatoe/patient keep copy of the form. Eddie Alvarado confirmed.       Discuss possible clinic care coordination re-enrollment. Eddie Jenny confirmed no other need, questions or concerns at this time. The patient's sister Eddie Alvarado has declined participating into clinic care coordination service at this time. I will discontinue outreach to the patient at this time.     Patient chart reviewed;  Per CCC management Itzel notes reviewed encounter dated 5/31/2022;   \"Enrollment status: Graduated.   Plan: No further outreaches at this time.  Patient will continue to follow the plan of care.  If new needs arise a new Care Coordination referral may be placed.\"    CHW Next Follow-up: N/A     Outreach frequency: N/A    Patient enrollment status: Graduated 5/31/2022 per pt chart reviewed. Patient sister Eddie Alvarado declined re-enrollment into CCC on 7/27/2022.     CHW Plan:   -No further " action need from CCC team at this time.   -Message route to Dr. Dent for further advised and clinical team to connect with patient's sister Eddie Alvarado for further assistance per Eddie Alvarado request.

## 2022-07-28 ENCOUNTER — TELEPHONE (OUTPATIENT)
Dept: FAMILY MEDICINE | Facility: CLINIC | Age: 27
End: 2022-07-28

## 2022-07-28 NOTE — TELEPHONE ENCOUNTER
Travel questionnaire was asked. Verified that they have no signs of COVID-19 symptoms.    Patient dropped off Request  for Medical opinion for Dr. Dent to fill out. Placed the original copies in the 's slot.    When forms are completed, patient would like it:    -Please call patient to let them know it's ready    Ok to leave a detailed message if unable to get a hold of the Patient.    Please re-route task back to the  to shred the copied forms and complete the task. Thanks!

## 2022-07-29 NOTE — TELEPHONE ENCOUNTER
Called patient to let him know that forms are ready for . Patient stated that his sister, Eliseo Calhoun, will be  Coming to  the forms. Patient verbally consented for her to pick them up.     Once forms are picked up please complete message. Thank you

## 2023-04-03 ENCOUNTER — PATIENT OUTREACH (OUTPATIENT)
Dept: CARE COORDINATION | Facility: CLINIC | Age: 28
End: 2023-04-03
Payer: COMMERCIAL

## 2023-04-03 NOTE — PROGRESS NOTES
"4/3/2023  Clinic Care Coordination Contact  Community Health Worker Initial Outreach    CHW Initial Information Gathering:  Referral Source: Self-patient/Caregiver  Preferred Hospital: [facility]  [phone]  Preferred Urgent Care: [facility], [phone]  Current living arrangement:: I live in a private home with family  Type of residence:: Private home - stairs  Community Resources: PCA (sister is the legal guardian, has PCA services)  Supplies Currently Used at Home: None  Equipment Currently Used at Home: none  Informal Support system:: Family (PCA. sister is legal guardian)  No PCP office visit in Past Year: No  Transportation means:: Regular car, Family (PCA and sister brings him to appt)  CHW Additional Questions  If ED/Hospital discharge, follow-up appointment scheduled as recommended?: N/A  Medication changes made following ED/Hospital discharge?: N/A  MyChart active?: No  Patient agreeable to assistance with activating MyChart?: No    Patient accepts CC: Yes. Patient scheduled for assessment with CC SW on 4-4-23 at 11am. Patient noted desire to discuss connect with agency to support to apply for SSI benefit.   Self referral: From Jeff Abdalla   \"Stated pt called to re enroll in [facility] MRN: [id number] \"calling to talk to ccc team as he need assistance in apply for SSI.\"    Janette :  Gayathri   ID# [id number]    Spoke to patient's sister. Stated he does not talk.  Stated she is the legal guardian for patient.  Stated she used to have a SW that helps to fill out report to the court every year.  Consent to communicate  Help to apply for SSI benefit now that his [place] citizen    CHW review assessment 4-4-23 goals and consult with CCC SW if needed.    Routed to Ezequiel Bustamante and MARY CARMEN Mcgraw  Community Health Worker (Covering for MARY CARMEN Mcgraw)  [facility]  Clinic Care Coordination  [email or web address]  [email or web address]   Office: " [phone]  Fax: [phone]

## 2023-04-04 ENCOUNTER — PATIENT OUTREACH (OUTPATIENT)
Dept: CARE COORDINATION | Facility: CLINIC | Age: 28
End: 2023-04-04

## 2023-04-04 ENCOUNTER — PATIENT OUTREACH (OUTPATIENT)
Dept: NURSING | Facility: CLINIC | Age: 28
End: 2023-04-04
Payer: COMMERCIAL

## 2023-04-04 NOTE — PROGRESS NOTES
Clinic Care Coordination Contact    Clinic Care Coordination Contact  OUTREACH    Referral Information:  Referral Source: Self-patient/Caregiver    Primary Diagnosis: Psychosocial    Chief Complaint   Patient presents with     Clinic Care Coordination - Initial     Ezequiel [facility] Utilization: [facility]; [employer]   Clinic Utilization  Difficulty keeping appointments:: No  Compliance Concerns: No  No-Show Concerns: No  No PCP office visit in Past Year: No  Utilization    Hospital Admissions  0             ED Visits  0             No Show Count (past year)  5                Current as of: 3/18/2023  1:27 PM              Clinical Concerns:  Current Medical Concerns:    Patient Active Problem List   Diagnosis     Intellectual disability     Immune to hepatitis A     Immune to hepatitis B from prior infection     Immune to varicella     Elevated transaminase measurement AST & ALT     Pervasive Developmental Disorders     Profound Intellectual Disabilities     Headache     Insomnia     Multiple allergies     Anemia     Speech and language deficits         Current Behavioral Concerns: Profound intellectual disabilities    Education Provided to patient: Introduced self and role      Health Maintenance Reviewed: Due/Overdue   Health Maintenance Due   Topic Date Due     YEARLY PREVENTIVE VISIT  09/10/2016     COVID-19 Vaccine (3 - Booster for Pfizer series) 08/11/2022     INFLUENZA VACCINE (1) 09/01/2022     PHQ-2 (once per calendar year)  01/01/2023       Clinical Pathway: None    Medication Management:  Did not review medications    Functional Status:       Living Situation:  Current living arrangement:: I live in a private home with family  Type of residence:: Private home - stairs    Lifestyle & Psychosocial Needs: [facility] contacted patient's sister using Janette . Introduced self and role. Consent to communicate is on file. She stated that she would like to help patient with the application  process for social security now that he has citizenship. [facility] stated that sister will need to contact the Disability Hub to obtain support with the application process. Provided number for this. Sister said that she has already applied at one point, but isn't sure where the process is at. [facility] stated that, again, she would need to contact the Disability Hub and they can direct her on how to find that out. Sister had no other questions. [facility] offered enrollment to continue checking in on their progress. Patient's sister declined enrollment.     Social Determinants of Health     Tobacco Use: Low Risk  (5/26/2022)    Patient History      Smoking Tobacco Use: Never      Smokeless Tobacco Use: Never      Passive Exposure: Not on file   Alcohol Use: Not on file   Financial Resource Strain: Not on file   Food Insecurity: Not on file   Transportation Needs: Not on file   Physical Activity: Not on file   Stress: Not on file   Social Connections: Not on file   Intimate Partner Violence: Not on file   Depression: Not at risk (5/26/2022)    PHQ-2      PHQ-2 Score: 0   Housing Stability: Not on file        Transportation means:: Regular car, Family (PCA and sister brings him to appt)     Yazidism or spiritual beliefs that impact treatment:: No  Informal Support system:: Family (PCA. sister is legal guardian)     Resources and Interventions:  Current Resources:      Community Resources: PCA (sister is the legal guardian, has PCA services)  Supplies Currently Used at Home: None  Equipment Currently Used at Home: none  Employment Status: disabled              Referrals Placed: None     Care Plan: No care plan was created. Patient was not enrolled.      Patient/Caregiver understanding: Yes           Plan: Patient was not enrolled in Care Coordination. [facility] let provider know of this. Encouraged patient's sister to notify care team if further needs arise and to call Disability Hub    CRISTI Dennis  Primary Care Clinic- [facility]  Work Care Coordinator  [facility] and [facility]  Ph: [phone]  4/4/2023 2:38 PM

## 2023-04-04 NOTE — PROGRESS NOTES
"Clinic Care Coordination Contact:    Today's date: 4-    Patient Outreach:   Pt appeared to CHW today's outreach (due) report. CHW completed pt chart reviewed below. No pt outreach done at this time based on [facility] SW notes reviewed below (encounter dated 4/04/2023).    Patient chart reviewed;  -Per CCC  notes encounter dated 4/04/2023 reviewed;  \"Plan: Patient was not enrolled in Care Coordination. CC let provider know of this. Encouraged patient's sister to notify care team if further needs arise and to call Disability Hub.\"    Plan:   No further action need from CCC team at this time.   "

## 2023-04-25 ENCOUNTER — PATIENT OUTREACH (OUTPATIENT)
Dept: CARE COORDINATION | Facility: CLINIC | Age: 28
End: 2023-04-25
Payer: COMMERCIAL

## 2023-04-25 NOTE — PROGRESS NOTES
"Clinic Care Coordination Contact:  Community Health Worker Initial Outreach    Today's date: 4/25/2023    Reason(s):   -Returning called (voice message below)  -Offer re-enrollment into [facility] Service     name:   Phone: [phone]  Agency: [facility]  Service    Patient chart reviewed;   Per CCC ANUPAMA Nieves notes reviewed encounter dated 4/04/2023; \"Plan: Patient was not enrolled in Care Coordination. [facility] let provider know of this. Encouraged patient's sister to notify care team if further needs arise and to call Disability Hub.\"    CHW Initial Information Gathering:  Referral Source: Other, specify (Patient's sister Eddie Alvarado)  Preferred Hospital[facility]  [phone]  Preferred Urgent Care: [facility], [phone]  Current living arrangement:: I live in a private home with family (Live with sister Eddie Alvarado per sister confirmed.)  Community Resources: PCA (Eddie Alvarado shared; Eliseo Lofotn currently active with PCA service; get 3 hours a day for every day; PCA person Eliseo Calhoun (\"my younger sister\" per Eddie Alvarado).)  Supplies Currently Used at Home: None  Equipment Currently Used at Home: none  Informal Support system:: Family (Family and Eliseo Lofton's siblings per Eddie Alvarado shared.)  Transportation means:: Family, Public transportation (Paw Alvarado)     Care Mgmt (GEN) screening:   -Fallen 2 or more times in the past year? No per Paw Jenny  -Any fall with injury in the past year? No per Eddie Alvarado    Potential Patient Outreach Discussion:   CHW call the patient through  service and spoke with Eddie Alvarado. CHW verified Eddie Alvarado's relationship and reason of called. Patient isn't with Eddie Alvarado at this time. Consent to communication is file. Have introduced myself to patient's sister.     Pt's sister shared info below and request CCC help to reapply pt SSI benefit and fill out application paperwork.     CHW completed pt chart reviewed above; CCC SW " Ezequiel outreach to pt on 4/04/2023 and pt's sister declined CCC service. Verified if pt or the sister connect with Disability Hub per ANUPAMA Nieves notes reviewed. Pt's sister confirmed that she went to the Disability office and they send her another SSI application for pt.     CHW offer re-enrollment into CCC service for patient. Pt's sister Eddie Alvarado agreed. CHW re-opened CCC program/enrollment into CCC service for pt. Clinic care coordination service was described to the patient's sister and immediate needs were discussed including any current active services such as PCA service. Patient's sister is aware CCC team includes CHW, SW, RN, and FRW.    CHW explained initial assessment, CCC monthly outreach follow up and CCC outreach standard work. CHW completed the CHW screening and Care Mgmt (GEN) above with patient's sister. CHW assisted with scheduling patient initial assessment appt with [facility] ANUPAMA Nieves. CHW suggested pt's sister to connect with CCC/CHW in the future with any additional resources need and attend pt initial assessment appt with pt. Eddie Ibanez Jenny confirmed.     Eddie Alvarado is aware no appt scheduled on 4/24/2023 found per pt appt desk reviewed. Eddie Alvarado unable to provide name of agency and phone number per CHW verified. CHW suggested pt and Eddie Alvarado to wait to hear from agency that plan to do home visit with pt on 4/24/2024 to reschedule pt appt. Pt's sister confirmed.     Patient's sister Eddie Alvarado shared:   -Reason of called CHW on 4/24/2023: Someone from the hospital plan to do home visit with Eliseo Lofton in the home on 4/24/2023 and they never show up. Who is it? Need help reschedule appt.   -Received paperwork for Eliseo Lofton that SSI application is closed. Need help with reapply Eliseo Lofton SSI. Went to the disability office for help and they send me a new application.   -Legal guardianship verify: I'm (FYI: Eddie Amatoe) the legal guardianship for my brother Eliseo Lofton. He live with me.     Patient accepts CC:  Yes. Patient scheduled for assessment with [facility]  Ezequiel on 5/04/2023 at 11:00AM via phone visit. Patient noted desire to discuss re-apply SSI benefit for patient and request help with fill out application per pt's sister Eddie Alvarado.     Plan:   -Patient and pt's sister Eddie Alvarado (Legal Guardianship) attend pt initial assessment appt on 5/04/2023 at 11:00AM with [facility] ANUPAMA Nieves via phone visit.   -Eddie Alvarado plan to drop off SSI application for pt to [facility] ANUPAMA on 5/01/2023 per CHW verified.

## 2023-04-25 NOTE — PROGRESS NOTES
"Clinic Care Coordination Contact:    Today's date: 4/25/2023    Voicemail check:   Voicemail date: 4/24/2023 at 9:57AM.   CHW received a voice message from female caller name \"Eddie Alvarado\" calling regards to patient Eliseo Lofton. Caller request appt with [facility]. Message left in CHW voicemail in English. Eddie Alvarado provides pt full name and call back phone number \"[phone].\"      CHW Plan:   CHW return called and offer re-enrollment into [facility].  "

## 2023-05-03 ENCOUNTER — PATIENT OUTREACH (OUTPATIENT)
Dept: CARE COORDINATION | Facility: CLINIC | Age: 28
End: 2023-05-03

## 2023-05-03 ENCOUNTER — OFFICE VISIT (OUTPATIENT)
Dept: FAMILY MEDICINE | Facility: CLINIC | Age: 28
End: 2023-05-03
Payer: COMMERCIAL

## 2023-05-03 VITALS
HEART RATE: 97 BPM | BODY MASS INDEX: 24.63 KG/M2 | DIASTOLIC BLOOD PRESSURE: 86 MMHG | OXYGEN SATURATION: 99 % | TEMPERATURE: 97.7 F | RESPIRATION RATE: 16 BRPM | SYSTOLIC BLOOD PRESSURE: 129 MMHG | WEIGHT: 139 LBS | HEIGHT: 63 IN

## 2023-05-03 DIAGNOSIS — F84.9 PERVASIVE DEVELOPMENTAL DISORDER: ICD-10-CM

## 2023-05-03 DIAGNOSIS — F79 INTELLECTUAL DISABILITY: Primary | ICD-10-CM

## 2023-05-03 DIAGNOSIS — L21.9 SEBORRHEIC DERMATITIS OF SCALP: ICD-10-CM

## 2023-05-03 PROCEDURE — 99214 OFFICE O/P EST MOD 30 MIN: CPT | Performed by: FAMILY MEDICINE

## 2023-05-03 RX ORDER — KETOCONAZOLE 20 MG/ML
SHAMPOO TOPICAL DAILY PRN
Qty: 120 ML | Refills: 0 | Status: SHIPPED | OUTPATIENT
Start: 2023-05-03

## 2023-05-03 NOTE — PROGRESS NOTES
"  Assessment & Plan     Intellectual disability  Pervasive Developmental Disorders  I wrote a letter confirming the things that I had written in the citizenship application.  I do believe he is disabled.    Seborrheic dermatitis of scalp  Discussed treatment of dermatitis.  Recommended Nizoral shampoo 1-2 times per week.  Follow-up if not improving.  - ketoconazole (NIZORAL) 2 % external shampoo  Dispense: 120 mL; Refill: 0    By Valent COVID-vaccine discussed.  Patient/family declined.     Nicotine/Tobacco Cessation:  He reports that he has never smoked. He has never used smokeless tobacco.    Los Dent MD  [facility]    Subjective   Eh is a 27 year old, presenting for the following health issues:  Forms (Letter for patient to get SSI) and Joint Pain        5/3/2023     1:20 PM   Additional Questions   Roomed by Tia   Accompanied by Sister     History of Present Illness       Reason for visit:  Letter from     He eats 2-3 servings of fruits and vegetables daily.He consumes 1 sweetened beverage(s) daily.      Patient is here with brother and sister who provide history.  Patient has been granted citizenship status.  He had a disability waiver to her and that.  He needs new documentation of disability to apply for Social Security disability.    Additionally, there is concern about scalp itching.  There is been some flaking from it.  He tried to shave his hair to make it go away.  Did not work.        Objective    /86 (BP Location: Right arm, Patient Position: Sitting, Cuff Size: Adult Regular)   Pulse 97   Temp 97.7  F (36.5  C) (Temporal)   Resp 16   Ht 1.595 m (5' 2.8\")   Wt 63 kg (139 lb)   SpO2 99%   BMI 24.78 kg/m    Body mass index is 24.78 kg/m .  Physical Exam   GENERAL: alert, not distressed  SKIN: Mild irritation to different areas of scalp skin.  He has some white flaking.            "

## 2023-05-03 NOTE — PROGRESS NOTES
Clinic Care Coordination Contact:  Community Health Worker Initial Outreach    Today's date: 5/03/2023    Reason: [facility] CHW Initial Outreach Called- (Reschedule patient initial assessment appt)     #: [id number]  Agency: [employer]  Phone: [phone]    Patient Outreach Discussion:   CHW was able to connect with the patient's legal guardianship/sister Eddie Alvarado today through  service regards to reason above. Eddie Alvarado is aware [facility] is off this week. Patient initial assessment appt on 5/04/2023 at 11:00AM with [facility] ANUPAMA via phone visit is cancelled and rescheduled to 5/11/2023 at 10:00AM with [facility] Ezequiel via phone visit per Eddie Jenny agreed. CHW suggested Eddie Alvarado or pt to connect with [facility] service with any questions regards to appt scheduling with [facility] dept. Eddie Alvarado confirmed.     Appt reminder:   CHW reminded Eddie Alvarado regards to pt appt today with Dr. Dent in [facility]. Eddie Alvarado confirmed and no other questions or concerns at this time.   Name: Eliseo Lofton MRN: [id number]     Date: 5/3/2023 Status: Scheduled     Time: 1:20 PM  1:20 PM Length: 20  20     Visit Type: OFFICE VISIT [2421] Copay: $0.00     Provider: Los Dent MD  PHONE,  Department: [facility] FAMILY MEDICINE/OB  UR  SERVICE     Encounter #: [id number] Notes: JOINT PAIN AND RASH     Plan:   Patient and legal guardianship/sister Eddie Alvarado attend pt appt on 5/03/2023 with Dr. Dent in [facility] and pt initial assessment appt on 5/11/2023 at 10:00AM with [facility] via phone visit.

## 2023-05-03 NOTE — LETTER
May 3, 2023      Eliseo Lofton  [address] [place] [address]        To Whom It May Concern,       Mr. Eliseo Lofton has been under my care since 2014.    It is my opinion that he is disabled due to pervasive intellectual disability.  He does not speak.  He relies on family member for help with ADLs and IADLs.    If more information is needed, please contact me at the clinic.    Sincerely,          Electronically signed:  Los Dent MD on 5/3/2023 at 1:33 PM

## 2023-05-11 ENCOUNTER — PATIENT OUTREACH (OUTPATIENT)
Dept: NURSING | Facility: CLINIC | Age: 28
End: 2023-05-11
Payer: COMMERCIAL

## 2023-05-11 NOTE — PROGRESS NOTES
Clinic Care Coordination Contact    Clinic Care Coordination Contact  OUTREACH    Referral Information:  Referral Source: PCP (Patient's sister Eddie Alvarado)    Primary Diagnosis: Psychosocial    Chief Complaint   Patient presents with     Clinic Care Coordination - Initial     Ezequiel ALTMAN        [place] Utilization: [facility]; [employer]  Clinic Utilization  Difficulty keeping appointments:: No  Compliance Concerns: No  No-Show Concerns: No  No PCP office visit in Past Year: No  Utilization    Hospital Admissions  0             ED Visits  0             No Show Count (past year)  5                Current as of: 5/5/2023  2:21 PM              Clinical Concerns:  Current Medical Concerns:    Patient Active Problem List   Diagnosis     Intellectual disability     Immune to hepatitis A     Immune to hepatitis B from prior infection     Immune to varicella     Elevated transaminase measurement AST & ALT     Pervasive Developmental Disorders     Profound Intellectual Disabilities     Headache     Insomnia     Multiple allergies     Anemia     Speech and language deficits         Current Behavioral Concerns: None    Education Provided to patient: Introduced self and role to sisterEddie      Health Maintenance Reviewed: Due/Overdue   Health Maintenance Due   Topic Date Due     YEARLY PREVENTIVE VISIT  09/10/2016     COVID-19 Vaccine (3 - Booster for Pfizer series) 08/11/2022     INFLUENZA VACCINE (1) 09/01/2022       Clinical Pathway: None    Medication Management:  Did not review medication    Functional Status:       Living Situation:  Current living arrangement:: I live in a private home with family (Live with sister Eddie Alvarado per sister confirmed.)    Lifestyle & Psychosocial Needs: [facility] contacted patient's sister, Eddie, using Janette . Consent to communicate is on file. Eddie stated that they have filled out the application to submit social security disability, MD filled out his portion, and they have now sent the  "application in. No other needs from [facility] at this time. [facility] verified that sister had no additional questions and asked if there were any other resources she needed. Sister declined. She stated that once they get the results from social security, she may need additional help. [facility] explained that she does not assist with disability paperwork or appeals, so [facility] encouraged sister to contact Disability Hub. This number was provided to her last month when [facility] spoke with her. [facility] offered to provide it again. Sister declined and stated she already had it. No further questions.     Social Determinants of Health     Tobacco Use: Low Risk  (5/3/2023)    Patient History      Smoking Tobacco Use: Never      Smokeless Tobacco Use: Never      Passive Exposure: Not on file   Alcohol Use: Not on file   Financial Resource Strain: Not on file   Food Insecurity: Not on file   Transportation Needs: Not on file   Physical Activity: Not on file   Stress: Not on file   Social Connections: Not on file   Intimate Partner Violence: Not on file   Depression: Not at risk (5/3/2023)    PHQ-2      PHQ-2 Score: 0   Housing Stability: Not on file        Transportation means:: Regular car, Family (PCA and sister brings him to appt)     Informal Support system:: Family (Family and Eliseo Lofton's siblings per Eddie Alvarado shared.)      Resources and Interventions:  Current Resources:      Community Resources: PCA (Eddie Alvarado shared; Eliseo Lofton currently active with PCA service; get 3 hours a day for every day; PCA person Eliseo Calhoun (\"my younger sister\" per Eddie Alvarado).)  Supplies Currently Used at Home: None  Equipment Currently Used at Home: none     Care Plan: None created. Patient was not enrolled    Patient/Caregiver understanding: Yes           Plan: Patient was not enrolled, as sister declined needing additional resources and had no other concerns to discuss during call. Sister has Disability Hub number and will use it if further social security disability " questions arise.     RANULFO Dennis, [facility]  Primary Care Clinic- Social Work Care Coordinator  [facility]- [facility] [place], [facility]  Assisting with Dwight   Ph: [phone]  5/11/2023 10:27 AM

## 2023-12-22 NOTE — TELEPHONE ENCOUNTER
Left message #1 at [phone]. Postponing task out to a week and will try again.    
Patient has a future appointment on 5/1/20 with . Completing task.    
The patient's brother left a voicemail asking for an appointment for the patient. Please call to schedule the appointment.  
no
